# Patient Record
(demographics unavailable — no encounter records)

---

## 2016-12-13 NOTE — ED PROVIDER NOTE - CARE PLAN
Principal Discharge DX:	Sepsis Principal Discharge DX:	Sepsis  Secondary Diagnosis:	UTI (urinary tract infection)

## 2016-12-13 NOTE — ED PROVIDER NOTE - OBJECTIVE STATEMENT
59 yo female presents for evaluation of weakness 57 yo female presents for evaluation of weakness, acute onset of lower abdominal pain, and WBC's of 34 ( per nursing notes)

## 2016-12-13 NOTE — CONSULT NOTE ADULT - SUBJECTIVE AND OBJECTIVE BOX
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   Patient received chemo - Taxotere, Gemcitabin and Avastin.   She was admitted in september this year for gram - bacteremia, complicated by PE and urinary retention.   She received IV antibiotics, started on anticoagulation and had a saul placed.   She also began having vaginal bleeding and was evaluated by Dr Milligan, got 5 palliative radiation fractions for the bleeding   For the retention she was followed up by Dr. Paez and saul was removed.  She now comes with weakness, malaise and suprapubic discomfort after having a saul catheter placed 2 days ago due to another episode of urinary retention.   Patient got last chemo 1.5 weeks ago      PAST MEDICAL & SURGICAL HISTORY:  Uterine sarcoma  Cervical cancer  Uterine cancer  Dyslipidemia  Essential hypertension, hypertension with unspecified goal  Diabetes  S/P bariatric surgery: lap band  PE      sodium chloride 0.9% lock flush 3milliLiter(s) IV Push once  sodium chloride 0.9% Bolus 500milliLiter(s) IV Bolus every 15 minutes  ertapenem  IVPB 1000milliGRAM(s) IV Intermittent once  vancomycin    Solution 250milliGRAM(s) Oral every 6 hours  sodium chloride 0.9% with potassium chloride 20 mEq/L 1000milliLiter(s) IV Continuous <Continuous>  dronabinol 2.5milliGRAM(s) Oral two times a day  ondansetron Injectable 4milliGRAM(s) IV Push every 6 hours PRN  insulin lispro (HumaLOG) corrective regimen sliding scale  SubCutaneous three times a day before meals  dextrose 5%. 1000milliLiter(s) IV Continuous <Continuous>  dextrose Gel 1Dose(s) Oral once PRN  dextrose 50% Injectable 12.5Gram(s) IV Push once  dextrose 50% Injectable 25Gram(s) IV Push once  dextrose 50% Injectable 25Gram(s) IV Push once  glucagon  Injectable 1milliGRAM(s) IntraMuscular once PRN  metoprolol succinate ER 50milliGRAM(s) Oral daily    MEDICATIONS  (STANDING):  sodium chloride 0.9% lock flush 3milliLiter(s) IV Push once  sodium chloride 0.9% Bolus 500milliLiter(s) IV Bolus every 15 minutes  ertapenem  IVPB 1000milliGRAM(s) IV Intermittent once  vancomycin    Solution 250milliGRAM(s) Oral every 6 hours  sodium chloride 0.9% with potassium chloride 20 mEq/L 1000milliLiter(s) IV Continuous <Continuous>  dronabinol 2.5milliGRAM(s) Oral two times a day  insulin lispro (HumaLOG) corrective regimen sliding scale  SubCutaneous three times a day before meals  dextrose 5%. 1000milliLiter(s) IV Continuous <Continuous>  dextrose 50% Injectable 12.5Gram(s) IV Push once  dextrose 50% Injectable 25Gram(s) IV Push once  dextrose 50% Injectable 25Gram(s) IV Push once  metoprolol succinate ER 50milliGRAM(s) Oral daily    MEDICATIONS  (PRN):  ondansetron Injectable 4milliGRAM(s) IV Push every 6 hours PRN Nausea and/or Vomiting  dextrose Gel 1Dose(s) Oral once PRN Blood Glucose LESS THAN 70 milliGRAM(s)/deciLiter  glucagon  Injectable 1milliGRAM(s) IntraMuscular once PRN Glucose <70 milliGRAM(s)/deciLiter      Allergies    No Known Allergies    Intolerances        SOCIAL HISTORY:  No S/D/IVDU    FAMILY HISTORY:  Family history of coronary artery disease      LABS:                        7.6    39.48 )-----------( 98       ( 13 Dec 2016 19:38 )             21.3     13 Dec 2016 19:38    135    |  105    |  59.0   ----------------------------<  95     3.8     |  15.0   |  1.45     Ca    8.1        13 Dec 2016 19:38    TPro  5.5    /  Alb  2.1    /  TBili  0.3    /  DBili  x      /  AST  17     /  ALT  11     /  AlkPhos  444    13 Dec 2016 19:38    PT/INR - ( 13 Dec 2016 19:38 )   PT: 23.3 sec;   INR: 2.10 ratio                 ROS  - Headache  - Neck Stiffness  - Chest Pain  - SOB  + Abd pain mild +weakness +diarrhea  - Pelvic Pain  - Leg Pain  - Head Ache    Vital Signs Last 24 Hrs  T(C): 36.5, Max: 36.5 (12-13 @ 16:30)  T(F): 97.7, Max: 97.7 (12-13 @ 16:30)  HR: 64 (64 - 64)  BP: 112/56 (86/55 - 112/56)  BP(mean): --  RR: 18 (18 - 18)  SpO2: 97% (97% - 98%)    Pt in bed feeling better    HEENT: PEARLA  Neck: Supple  Cardio: S1 S2 No Murmur  Pulm: CTA No Rales or Ronchi  Abd: Soft NT mild distension BS+ No rebound  Rectal Pelvic Breast - refused  Ext: No DCT  Skin: No Rash  Neuro: Awake Pleasant    Leukocytosis secondary to infection - suspicious for C diff colitis, po vanco, invanz started by Gyn/ Onc pan culture  PE - Will start lovenox theraputic in am and stop Xeralto for now until abd pain etiology more clear  DM - SS  HTN- meds with parameters  Uterine Sarcoma - chemo 1 week ago HPI:  59 yo, history of uterine sarcoma, came in for anemia, weakness and suprapubic pain and multiple bowel movements over past 2 days, poor appetite 20lb wt loss    HPI:  Known to our service with 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   Patient received chemo - Taxotere, Gemcitabin and Avastin.   She was admitted in september this year for gram - bacteremia, complicated by PE and urinary retention.   She received IV antibiotics, started on anticoagulation and had a saul placed.   She also began having vaginal bleeding and was evaluated by Dr Milligan, got 5 palliative radiation fractions for the bleeding   For the retention she was followed up by Dr. Paez and saul was removed.  She now comes with weakness, malaise and suprapubic discomfort after having a saul catheter placed 2 days ago due to another episode of urinary retention.   Patient got last chemo 1.5 weeks ago      PAST MEDICAL & SURGICAL HISTORY:  Uterine sarcoma  Cervical cancer  Uterine cancer  Dyslipidemia  Essential hypertension, hypertension with unspecified goal  Diabetes  S/P bariatric surgery: lap band  PE    Home meds  Norvasc 5  bentyl  zofrna 4mg q6h  Questran 4g  Xeralto 20 qd  Kcl  Toprol 50 qd  Insulin  Toujeo 8units sq qd    sodium chloride 0.9% lock flush 3milliLiter(s) IV Push once  sodium chloride 0.9% Bolus 500milliLiter(s) IV Bolus every 15 minutes  ertapenem  IVPB 1000milliGRAM(s) IV Intermittent once  vancomycin    Solution 250milliGRAM(s) Oral every 6 hours  sodium chloride 0.9% with potassium chloride 20 mEq/L 1000milliLiter(s) IV Continuous <Continuous>  dronabinol 2.5milliGRAM(s) Oral two times a day  ondansetron Injectable 4milliGRAM(s) IV Push every 6 hours PRN  insulin lispro (HumaLOG) corrective regimen sliding scale  SubCutaneous three times a day before meals  dextrose 5%. 1000milliLiter(s) IV Continuous <Continuous>  dextrose Gel 1Dose(s) Oral once PRN  dextrose 50% Injectable 12.5Gram(s) IV Push once  dextrose 50% Injectable 25Gram(s) IV Push once  dextrose 50% Injectable 25Gram(s) IV Push once  glucagon  Injectable 1milliGRAM(s) IntraMuscular once PRN  metoprolol succinate ER 50milliGRAM(s) Oral daily    MEDICATIONS  (STANDING):  sodium chloride 0.9% lock flush 3milliLiter(s) IV Push once  sodium chloride 0.9% Bolus 500milliLiter(s) IV Bolus every 15 minutes  ertapenem  IVPB 1000milliGRAM(s) IV Intermittent once  vancomycin    Solution 250milliGRAM(s) Oral every 6 hours  sodium chloride 0.9% with potassium chloride 20 mEq/L 1000milliLiter(s) IV Continuous <Continuous>  dronabinol 2.5milliGRAM(s) Oral two times a day  insulin lispro (HumaLOG) corrective regimen sliding scale  SubCutaneous three times a day before meals  dextrose 5%. 1000milliLiter(s) IV Continuous <Continuous>  dextrose 50% Injectable 12.5Gram(s) IV Push once  dextrose 50% Injectable 25Gram(s) IV Push once  dextrose 50% Injectable 25Gram(s) IV Push once  metoprolol succinate ER 50milliGRAM(s) Oral daily    MEDICATIONS  (PRN):  ondansetron Injectable 4milliGRAM(s) IV Push every 6 hours PRN Nausea and/or Vomiting  dextrose Gel 1Dose(s) Oral once PRN Blood Glucose LESS THAN 70 milliGRAM(s)/deciLiter  glucagon  Injectable 1milliGRAM(s) IntraMuscular once PRN Glucose <70 milliGRAM(s)/deciLiter      Allergies    No Known Allergies    Intolerances        SOCIAL HISTORY:  No S/D/IVDU    FAMILY HISTORY:  Family history of coronary artery disease      LABS:                        7.6    39.48 )-----------( 98       ( 13 Dec 2016 19:38 )             21.3     13 Dec 2016 19:38    135    |  105    |  59.0   ----------------------------<  95     3.8     |  15.0   |  1.45     Ca    8.1        13 Dec 2016 19:38    TPro  5.5    /  Alb  2.1    /  TBili  0.3    /  DBili  x      /  AST  17     /  ALT  11     /  AlkPhos  444    13 Dec 2016 19:38    PT/INR - ( 13 Dec 2016 19:38 )   PT: 23.3 sec;   INR: 2.10 ratio                 ROS  - Headache  - Neck Stiffness  - Chest Pain  - SOB  + Abd pain mild +weakness +diarrhea  - Pelvic Pain  - Leg Pain  - Head Ache    Vital Signs Last 24 Hrs  T(C): 36.5, Max: 36.5 (12-13 @ 16:30)  T(F): 97.7, Max: 97.7 (12-13 @ 16:30)  HR: 64 (64 - 64)  BP: 112/56 (86/55 - 112/56)  BP(mean): --  RR: 18 (18 - 18)  SpO2: 97% (97% - 98%)    Pt in bed feeling better    HEENT: PEARLA  Neck: Supple  Cardio: S1 S2 No Murmur  Pulm: CTA No Rales or Ronchi  Abd: Soft NT mild distension BS+ No rebound  Rectal Pelvic Breast - refused  Ext: No DCT  Skin: No Rash  Neuro: Awake Pleasant    Leukocytosis secondary to infection - suspicious for C diff colitis, po vanco, invanz started by Gyn/ Onc pan culture  PE - Will start lovenox theraputic in am and stop Xeralto for now until abd pain etiology more clear  DM - SS  HTN- meds with parameters  Uterine Sarcoma - chemo 1 week ago HPI:  59 yo, history of uterine sarcoma, came in for anemia, weakness and suprapubic pain and multiple bowel movements over past 2 days, poor appetite 20lb wt loss    HPI:  Known to our service with 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   Patient received chemo - Taxotere, Gemcitabin and Avastin.   She was admitted in september this year for gram - bacteremia, complicated by PE and urinary retention.   She received IV antibiotics, started on anticoagulation and had a saul placed.   She also began having vaginal bleeding and was evaluated by Dr Milligan, got 5 palliative radiation fractions for the bleeding   For the retention she was followed up by Dr. Paez and saul was removed.  She now comes with weakness, malaise and suprapubic discomfort after having a saul catheter placed 2 days ago due to another episode of urinary retention.   Patient got last chemo 1.5 weeks ago      PAST MEDICAL & SURGICAL HISTORY:  Uterine sarcoma  Cervical cancer  Uterine cancer  Dyslipidemia  Essential hypertension, hypertension with unspecified goal  Diabetes  S/P bariatric surgery: lap band  PE    Home meds  Norvasc 5  bentyl  zofrna 4mg q6h  Questran 4g  Xeralto 20 qd  Kcl  Toprol 50 qd  Insulin ss  Toujeo 8units sq qd  Vancomycin 250 qid  Invanz 1g q24      sodium chloride 0.9% lock flush 3milliLiter(s) IV Push once  sodium chloride 0.9% Bolus 500milliLiter(s) IV Bolus every 15 minutes  ertapenem  IVPB 1000milliGRAM(s) IV Intermittent once  vancomycin    Solution 250milliGRAM(s) Oral every 6 hours  sodium chloride 0.9% with potassium chloride 20 mEq/L 1000milliLiter(s) IV Continuous <Continuous>  dronabinol 2.5milliGRAM(s) Oral two times a day  ondansetron Injectable 4milliGRAM(s) IV Push every 6 hours PRN  insulin lispro (HumaLOG) corrective regimen sliding scale  SubCutaneous three times a day before meals  dextrose 5%. 1000milliLiter(s) IV Continuous <Continuous>  dextrose Gel 1Dose(s) Oral once PRN  dextrose 50% Injectable 12.5Gram(s) IV Push once  dextrose 50% Injectable 25Gram(s) IV Push once  dextrose 50% Injectable 25Gram(s) IV Push once  glucagon  Injectable 1milliGRAM(s) IntraMuscular once PRN  metoprolol succinate ER 50milliGRAM(s) Oral daily    MEDICATIONS  (STANDING):  sodium chloride 0.9% lock flush 3milliLiter(s) IV Push once  sodium chloride 0.9% Bolus 500milliLiter(s) IV Bolus every 15 minutes  ertapenem  IVPB 1000milliGRAM(s) IV Intermittent once  vancomycin    Solution 250milliGRAM(s) Oral every 6 hours  sodium chloride 0.9% with potassium chloride 20 mEq/L 1000milliLiter(s) IV Continuous <Continuous>  dronabinol 2.5milliGRAM(s) Oral two times a day  insulin lispro (HumaLOG) corrective regimen sliding scale  SubCutaneous three times a day before meals  dextrose 5%. 1000milliLiter(s) IV Continuous <Continuous>  dextrose 50% Injectable 12.5Gram(s) IV Push once  dextrose 50% Injectable 25Gram(s) IV Push once  dextrose 50% Injectable 25Gram(s) IV Push once  metoprolol succinate ER 50milliGRAM(s) Oral daily    MEDICATIONS  (PRN):  ondansetron Injectable 4milliGRAM(s) IV Push every 6 hours PRN Nausea and/or Vomiting  dextrose Gel 1Dose(s) Oral once PRN Blood Glucose LESS THAN 70 milliGRAM(s)/deciLiter  glucagon  Injectable 1milliGRAM(s) IntraMuscular once PRN Glucose <70 milliGRAM(s)/deciLiter      Allergies    No Known Allergies    Intolerances        SOCIAL HISTORY:  No S/D/IVDU    FAMILY HISTORY:  Family history of coronary artery disease      LABS:                        7.6    39.48 )-----------( 98       ( 13 Dec 2016 19:38 )             21.3     13 Dec 2016 19:38    135    |  105    |  59.0   ----------------------------<  95     3.8     |  15.0   |  1.45     Ca    8.1        13 Dec 2016 19:38    TPro  5.5    /  Alb  2.1    /  TBili  0.3    /  DBili  x      /  AST  17     /  ALT  11     /  AlkPhos  444    13 Dec 2016 19:38    PT/INR - ( 13 Dec 2016 19:38 )   PT: 23.3 sec;   INR: 2.10 ratio                 ROS  - Headache  - Neck Stiffness  - Chest Pain  - SOB  + Abd pain mild +weakness +diarrhea  - Pelvic Pain  - Leg Pain  - Head Ache    Vital Signs Last 24 Hrs  T(C): 36.5, Max: 36.5 (12-13 @ 16:30)  T(F): 97.7, Max: 97.7 (12-13 @ 16:30)  HR: 64 (64 - 64)  BP: 112/56 (86/55 - 112/56)  BP(mean): --  RR: 18 (18 - 18)  SpO2: 97% (97% - 98%)    Pt in bed feeling better    HEENT: PEARLA  Neck: Supple  Cardio: S1 S2 No Murmur  Pulm: CTA No Rales or Ronchi  Abd: Soft NT mild distension BS+ No rebound  Rectal Pelvic Breast - refused  Ext: No DCT  Skin: No Rash  Neuro: Awake Pleasant    Leukocytosis secondary to infection - suspicious for C diff colitis, po vanco, invanz started by Gyn/ Onc pan culture  PE - Will start lovenox theraputic in am and stop Xeralto for now until abd pain etiology more clear  DM - SS  HTN- meds with parameters  Uterine Sarcoma - chemo 1 week ago

## 2016-12-13 NOTE — ED PROVIDER NOTE - ENMT, MLM
Airway patent, Nasal mucosa clear. Mouth with normal mucosa. Throat has no vesicles, no oropharyngeal exudates and uvula is midline.

## 2016-12-13 NOTE — ED PROVIDER NOTE - ENMT NEGATIVE STATEMENT, MLM
Ears: no ear pain and no hearing problems.Nose: no nasal congestion and no nasal drainage.Mouth/Throat: no dysphagia, no hoarseness and no throat pain.Neck: no lumps, no pain, no stiffness and no swollen glands.

## 2016-12-13 NOTE — CONSULT NOTE ADULT - SUBJECTIVE AND OBJECTIVE BOX
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   Patient received chemo - Taxotere, Gemcitabin and Avastin.   She was admitted in september this year for gram - bacteremia, complicated by PE and urinary retention.   She received IV antibiotics, started on anticoagulation and had a saul placed.   She also began having vaginal bleeding and was evaluated by Dr Milligan, got 5 palliative radiation fractions for the bleeding   For the retention she was followed up by Dr. Paez and saul was removed.  She now comes with weakness, malaise and suprapubic discomfort after having a saul catheter placed 2 days ago due to another episode of urinary retention.   Patient got last chemo 1.5 weeks ago        OB/GYN HISTORY:     Surgical History:      S/P bariatric surgery      Past Medical History:   Uterine sarcoma  PE  Dyslipidemia  Essential hypertension, hypertension with unspecified goal  Diabetes      No Known Allergies      sodium chloride 0.9% lock flush 3milliLiter(s) IV Push once  sodium chloride 0.9% Bolus 500milliLiter(s) IV Bolus every 15 minutes      FAMILY HISTORY:  Family history of coronary artery disease (Father)        MEDICATIONS  (STANDING):  sodium chloride 0.9% lock flush 3milliLiter(s) IV Push once  sodium chloride 0.9% Bolus 500milliLiter(s) IV Bolus every 15 minutes        OBJECTIVE FINDINGS:    Vital Signs Last 24 Hrs  T(C): 36.5, Max: 36.5 (12-13 @ 16:30)  T(F): 97.7, Max: 97.7 (12-13 @ 16:30)  HR: 64 (64 - 64)  BP: 112/56 (86/55 - 112/56)  BP(mean): --  RR: 18 (18 - 18)  SpO2: 97% (97% - 98%)    PHYSICAL EXAM:    GENERAL: NAD, well-developed  HEAD:  Atraumatic, Normocephalic  EYES: EOMI, PERRLA, conjunctiva and sclera clear  NERVOUS SYSTEM:  Alert & Oriented X3, Good concentration  CHEST/LUNG: Clear to percussion bilaterally; No rales, rhonchi, wheezing, or rubs  HEART: Regular rate and rhythm; No murmurs, rubs, or gallops  ABDOMEN: Soft, Nontender, Nondistended; Bowel sounds present, No rebound, No guarding  EXTREMITIES:  2+ Peripheral Pulses, No clubbing, cyanosis, or edema, Samir's sign negative        LABS:  BCx, UCx, CBC, CMP, INR, stool cx

## 2016-12-13 NOTE — ED ADULT TRIAGE NOTE - NS ED TRIAGE AVPU SCALE
Alert-The patient is alert, awake and responds to voice. The patient is oriented to time, place, and person. The triage nurse is able to obtain subjective information.

## 2016-12-13 NOTE — ED ADULT TRIAGE NOTE - CHIEF COMPLAINT QUOTE
Pt BIBA from [facility] for elevated WBC count, currently being treated with ABX for c-diff. Pt also comes with saul cath in place. Denies any other complaints

## 2016-12-13 NOTE — ED PROVIDER NOTE - PMH
Diabetes    Dyslipidemia    Essential hypertension, hypertension with unspecified goal    Uterine sarcoma

## 2016-12-13 NOTE — ED PROVIDER NOTE - PROGRESS NOTE DETAILS
GYN PA contacted and case discussed as patient states she is a Dr. Lewis patient. Dr. Ribeiro has evaluated. Dr. Russell has evaluated the pateint and will admit to her service for further management.

## 2016-12-14 NOTE — CONSULT NOTE ADULT - SUBJECTIVE AND OBJECTIVE BOX
[facility] SAI COLLINS MD FACP   JARED NORTON MD  Diplomates American Board of Internal Medicine and Infecctious Diseases      MRN-[id number]  TRAN MCDONALD is a 58y  Female     CC:ADMITTED WITH SEVERE LEUKOCYTOSIS TO 40K    HX STAGE 4 UTERINE CANCER ON CHEMOTHERAPY  1 WEEK AGO GIVEN PO ANTIBIOTICS AT NH FOR UTI  LOOSE WATERY STOOLS PAST 48HRS WITH PAIN    Past Medical & Surgical Hx:  PAST MEDICAL & SURGICAL HISTORY:  Uterine sarcoma  Cervical cancer  Uterine cancer  Dyslipidemia  Essential hypertension, hypertension with unspecified goal  Diabetes  S/P bariatric surgery: lap band      Problem List:  HEALTH ISSUES - PROBLEM Dx:  Clostridium difficile colitis: Clostridium difficile colitis  UTI (urinary tract infection): UTI (urinary tract infection)  Urinary retention: Urinary retention  Pulmonary embolism, other: Pulmonary embolism, other  Uterine sarcoma: Uterine sarcoma      ANTIBIOTICS:   vancomycin    Solution 250milliGRAM(s) Oral every 6 hours  metroNIDAZOLE  IVPB  IV Intermittent        Review of Systems: - Negative except as mentioned below.PAIN LLQ AND DIARRHEA      Physical Exam:    Vital Signs Last 24 Hrs  T(C): 36.3, Max: 36.6 (12-14 @ 09:30)  T(F): 97.4, Max: 97.8 (12-14 @ 09:30)  HR: 81 (60 - 81)  BP: 131/71 (102/59 - 131/71)  BP(mean): --  RR: 18 (18 - 18)  SpO2: 100% (99% - 100%)        GEN: NAD, pleasant. ILL APPEARING  HEENT: normocephalic and atraumatic. EOMI. IVÁN. Moist mucosa. Clear Posterior pharynx.  NECK: Supple. No carotid bruits.  No lymphadenopathy or thyromegaly.  LUNGS: Clear to auscultation.  HEART: Regular rate and rhythm without murmur.  ABDOMEN: Soft, nontender, and nondistended.  Positive bowel sounds.  SL DISTENDED. ACTIVE BSS. TENDER NO REBOUND LLQ  EXTREMITIES: Without any cyanosis, clubbing, rash, lesions or edema.  NEUROLOGIC: Cranial nerves II through XII are grossly intact.  MUSCULOSKELETAL:  SKIN: No ulceration or induration present.      Labs:                        7.6    39.48 )-----------( 98       ( 13 Dec 2016 19:38 )             21.3     13 Dec 2016 19:38    135    |  105    |  59.0   ----------------------------<  95     3.8     |  15.0   |  1.45     Ca    8.1        13 Dec 2016 19:38    TPro  5.5    /  Alb  2.1    /  TBili  0.3    /  DBili  x      /  AST  17     /  ALT  11     /  AlkPhos  444    13 Dec 2016 19:38    PT/INR - ( 13 Dec 2016 19:38 )   PT: 23.3 sec;   INR: 2.10 ratio           Urinalysis Basic - ( 14 Dec 2016 03:46 )    Color: Pale Yellow / Appearance: very cloudy / S.010 / pH: x  Gluc: x / Ketone: Negative  / Bili: Negative / Urobili: Negative mg/dL   Blood: x / Protein: 100 mg/dL / Nitrite: Negative   Leuk Esterase: Moderate / RBC: 6-10 /HPF / WBC TNTC /HPF   Sq Epi: x / Non Sq Epi: Occasional / Bacteria: Occasional        Hemoglobin: 7.6 g/dL ( @ 19:38)  WBC Count: 39.48 K/uL ( @ 19:38)  Hematocrit: 21.3 % ( @ 19:38)  Platelet Count - Automated: 98 K/uL ( @ 19:38)    Blood Urea Nitrogen, Serum: 59.0 mg/dL <H> ( @ 19:38)  Sodium, Serum: 135 mmol/L ( @ 19:38)  Potassium, Serum: 3.8 mmol/L ( 19:38)          MICROBIOLOGY  Clostridium difficile Toxin by PCR (16 @ 21:28)    C Diff by PCR Result: Detected    Clostridium difficile Toxin by PCR: RESULT INTERPRETATION:    Detected - Clostridium difficile toxin B detected by amplified DNA PCR .        RADIOLOGY  No significant retroperitoneal lymphadenopathy. Mildly enlarged left   para-aortic lymph nodes, unchanged. No definite pelvic adenopathy. A   Manning catheter is noted in the urinary bladder. There is diffuse   thickening of the bladder wall. There is enlargement of the uterus. There   is now diffuse thickening of the rectal wall which is a new finding since   the prior examination. In addition there is diffuse thickening of the   wall of the sigmoid, descending colon, splenic flexure. These findings   are new.

## 2016-12-14 NOTE — PROGRESS NOTE ADULT - SUBJECTIVE AND OBJECTIVE BOX
GYNECOLOGIC ONCOLOGY PROGRESS NOTE    PROBLEMS:    Urinary retention  Pulmonary embolism, other  Uterine sarcoma  Elevated WBC    Pt seen and examined at bedside. Still feeling weak, received 1 XPRBC and planned for another unit, will get CBC 4 hours post transfusion.  Labs significant for WBC 39.48k, patient denies getting any WBC proliferating agents. Planned ID consult.   Patient was on Xeralto, last dose yesterday needs lovenox as she had a PE earlier this year.  Patient has diarrhea, C.Diff pending.   Indwelling urinary catheter for urinary retention.   FeNa 2%, creatinine 1.45.  Urine in saul looks cloudy  Getting IV Invanz and PO vancomycin  Blood and urine cultures pending.       Physical Exam:  Constitutional: NAD  Pulmonary: clear to auscultation bilaterally   Cardiovascular: Regular rate and rhythm   Abdomen: soft,non-distended, normal bowel sounds, minimal suprapubic tenderness  Extremities: no lower extremity edema or calve tenderness, Samir's sign negative.  :     VITALS:  T(F): 97.6, Max: 97.7 (12-13 @ 16:30)  HR: 70 (60 - 71)  BP: 128/60 (86/55 - 128/60)  RR: 18 (18 - 18)  SpO2: 100% (97% - 100%)      MEDICATIONS  (STANDING):  vancomycin    Solution 250milliGRAM(s) Oral every 6 hours  sodium chloride 0.9% with potassium chloride 20 mEq/L 1000milliLiter(s) IV Continuous <Continuous>  dronabinol 2.5milliGRAM(s) Oral two times a day  insulin lispro (HumaLOG) corrective regimen sliding scale  SubCutaneous three times a day before meals  dextrose 5%. 1000milliLiter(s) IV Continuous <Continuous>  dextrose 50% Injectable 12.5Gram(s) IV Push once  dextrose 50% Injectable 25Gram(s) IV Push once  dextrose 50% Injectable 25Gram(s) IV Push once  metoprolol succinate ER 50milliGRAM(s) Oral daily  ertapenem  IVPB 1000milliGRAM(s) IV Intermittent every 24 hours          LABS:                        7.6    39.48 )-----------( 98       ( 13 Dec 2016 19:38 )             21.3     13 Dec 2016 19:38    135    |  105    |  59.0   ----------------------------<  95     3.8     |  15.0   |  1.45     Ca    8.1        13 Dec 2016 19:38    TPro  5.5    /  Alb  2.1    /  TBili  0.3    /  DBili  x      /  AST  17     /  ALT  11     /  AlkPhos  444    13 Dec 2016 19:38    PT/INR - ( 13 Dec 2016 19:38 )   PT: 23.3 sec;   INR: 2.10 ratio           Urinalysis Basic - ( 14 Dec 2016 03:46 )    Color: Pale Yellow / Appearance: very cloudy / S.010 / pH: x  Gluc: x / Ketone: Negative  / Bili: Negative / Urobili: Negative mg/dL   Blood: x / Protein: 100 mg/dL / Nitrite: Negative   Leuk Esterase: Moderate / RBC: 6-10 /HPF / WBC TNTC /HPF   Sq Epi: x / Non Sq Epi: Occasional / Bacteria: Occasional        RADIOLOGY & ADDITIONAL TESTS:

## 2016-12-14 NOTE — PROGRESS NOTE ADULT - SUBJECTIVE AND OBJECTIVE BOX
HPI:  59 yo, history of uterine sarcoma, came in for anemia, weakness and suprapubic pain and multiple bowel movements over past 2 days, poor appetite 20lb wt loss     Allergies    No Known Allergies    Intolerances      Uterine sarcoma  Cervical cancer  Uterine cancer  Dyslipidemia  Essential hypertension, hypertension with unspecified goal  Diabetes    MEDICATIONS  (STANDING):  vancomycin    Solution 250milliGRAM(s) Oral every 6 hours  sodium chloride 0.9% with potassium chloride 20 mEq/L 1000milliLiter(s) IV Continuous <Continuous>  dronabinol 2.5milliGRAM(s) Oral two times a day  insulin lispro (HumaLOG) corrective regimen sliding scale  SubCutaneous three times a day before meals  dextrose 5%. 1000milliLiter(s) IV Continuous <Continuous>  dextrose 50% Injectable 12.5Gram(s) IV Push once  dextrose 50% Injectable 25Gram(s) IV Push once  dextrose 50% Injectable 25Gram(s) IV Push once  metoprolol succinate ER 50milliGRAM(s) Oral daily  enoxaparin Injectable 70milliGRAM(s) SubCutaneous every 12 hours  acetaminophen    Suspension. 500milliGRAM(s) Oral once  metroNIDAZOLE  IVPB  IV Intermittent   dextrose 5%. 1000milliLiter(s) IV Continuous <Continuous>  metroNIDAZOLE  IVPB 500milliGRAM(s) IV Intermittent once    MEDICATIONS  (PRN):  ondansetron Injectable 4milliGRAM(s) IV Push every 6 hours PRN Nausea and/or Vomiting  dextrose Gel 1Dose(s) Oral once PRN Blood Glucose LESS THAN 70 milliGRAM(s)/deciLiter  glucagon  Injectable 1milliGRAM(s) IntraMuscular once PRN Glucose <70 milliGRAM(s)/deciLiter                           11.9   45.67 )-----------( 102      ( 14 Dec 2016 17:59 )             33.4     13 Dec 2016 19:38    135    |  105    |  59.0   ----------------------------<  95     3.8     |  15.0   |  1.45     Ca    8.1        13 Dec 2016 19:38  Mg     1.5       14 Dec 2016 18:08    TPro  5.5    /  Alb  2.1    /  TBili  0.3    /  DBili  x      /  AST  17     /  ALT  11     /  AlkPhos  444    13 Dec 2016 19:38    PT/INR - ( 13 Dec 2016 19:38 )   PT: 23.3 sec;   INR: 2.10 ratio           Urinalysis Basic - ( 14 Dec 2016 03:46 )    Color: Pale Yellow / Appearance: very cloudy / S.010 / pH: x  Gluc: x / Ketone: Negative  / Bili: Negative / Urobili: Negative mg/dL   Blood: x / Protein: 100 mg/dL / Nitrite: Negative   Leuk Esterase: Moderate / RBC: 6-10 /HPF / WBC TNTC /HPF   Sq Epi: x / Non Sq Epi: Occasional / Bacteria: Occasional    ;  Vital Signs Last 24 Hrs  T(C): 36.2, Max: 36.6 ( @ 09:30)  T(F): 97.1, Max: 97.8 ( @ 09:30)  HR: 77 (64 - 81)  BP: 128/68 (111/56 - 131/71)  BP(mean): --  RR: 18 (18 - 18)  SpO2: 100% (99% - 100%)    CAPILLARY BLOOD GLUCOSE  123 (14 Dec 2016 20:39)  66 (14 Dec 2016 18:15)  64 (14 Dec 2016 18:00)  82 (14 Dec 2016 12:05)  73 (14 Dec 2016 08:30)  72 (14 Dec 2016 00:45)      I & Os for current day (as of  @ 22:14)  =============================================  IN: 1250 ml / OUT: 0 ml / NET: 1250 ml    Pt in bed feeling better Diarrhea less frequent No SOB, CP, ABd pain    HEENT: PEARLA  Neck: Supple  Cardio: S1 S2 No Murmur  Pulm: CTA No Rales or Ronchi  Abd: Soft NT mild distension BS+ No rebound  Rectal Pelvic Breast - refused  Ext: No DCT  Skin: No Rash  Neuro: Awake Pleasant    C diff colitis- po vanco, IV flagyl  PE - lovenox theraputic   DM - SS  HTN- meds with parameters  Uterine Sarcoma - chemo 1 week ago

## 2016-12-14 NOTE — PROGRESS NOTE ADULT - SUBJECTIVE AND OBJECTIVE BOX
Patient seen and evaluated  Feeling unchanged  C.Diff positive, patient getting PO Vanco  IV INVANZ stopped  CT done, shows increase in size of liver and lung mets, no abscess, but evidence of colitis.  Patient is on regular diet.   mild dehydration    Abdomen soft, not tender  Extremities - no calf tenderness

## 2016-12-15 NOTE — PROGRESS NOTE ADULT - SUBJECTIVE AND OBJECTIVE BOX
GYNECOLOGIC ONCOLOGY PROGRESS NOTE      PROBLEMS:    Clostridium difficile colitis  UTI (urinary tract infection)  Urinary retention  Pulmonary embolism, other  Uterine sarcoma      Pt seen and examined at bedside. Reports feeling better, diarrhea is improving.   Patient with known C.Diff, getting PO Vancomycin and IV Flagyl as per ID   Pain well-controlled.   Denies Nausea, or vomiting.  Denies shortness of breath, chest pain or dyspnea on exertion.  Tolerating diet.    OBJECTIVE:     VITALS:  T(F): 96.1, Max: 97.8 (12-14 @ 09:30)  HR: 68 (65 - 81)  BP: 128/62 (122/52 - 131/71)  RR: 18 (18 - 18)  SpO2: 97% (97% - 97%)        MEDICATIONS  (STANDING):  vancomycin    Solution 250milliGRAM(s) Oral every 6 hours  dronabinol 2.5milliGRAM(s) Oral two times a day  insulin lispro (HumaLOG) corrective regimen sliding scale  SubCutaneous three times a day before meals  dextrose 5%. 1000milliLiter(s) IV Continuous <Continuous>  dextrose 50% Injectable 12.5Gram(s) IV Push once  dextrose 50% Injectable 25Gram(s) IV Push once  dextrose 50% Injectable 25Gram(s) IV Push once  metoprolol succinate ER 50milliGRAM(s) Oral daily  enoxaparin Injectable 70milliGRAM(s) SubCutaneous every 12 hours  acetaminophen    Suspension. 500milliGRAM(s) Oral once  metroNIDAZOLE  IVPB  IV Intermittent   dextrose 5%. 1000milliLiter(s) IV Continuous <Continuous>  metroNIDAZOLE  IVPB 500milliGRAM(s) IV Intermittent every 8 hours  dextrose 5% with potassium chloride 20 mEq/L 1000milliLiter(s) IV Continuous <Continuous>    MEDICATIONS  (PRN):  ondansetron Injectable 4milliGRAM(s) IV Push every 6 hours PRN Nausea and/or Vomiting  dextrose Gel 1Dose(s) Oral once PRN Blood Glucose LESS THAN 70 milliGRAM(s)/deciLiter  glucagon  Injectable 1milliGRAM(s) IntraMuscular once PRN Glucose <70 milliGRAM(s)/deciLiter      Physical Exam:  Constitutional: NAD  Pulmonary: clear to auscultation bilaterally   Cardiovascular: Regular rate and rhythm   Abdomen: soft, non-tender, non-distended, normal bowel signs  Extremities: no lower extremity edema or calve tenderness, Samir's sign negative.      LABS:                        11.9   45.67 )-----------( 102      ( 14 Dec 2016 17:59 )             33.4     13 Dec 2016 19:38    135    |  105    |  59.0   ----------------------------<  95     3.8     |  15.0   |  1.45     Ca    8.1        13 Dec 2016 19:38  Mg     1.5       14 Dec 2016 18:08    TPro  5.5    /  Alb  2.1    /  TBili  0.3    /  DBili  x      /  AST  17     /  ALT  11     /  AlkPhos  444    13 Dec 2016 19:38    PT/INR - ( 13 Dec 2016 19:38 )   PT: 23.3 sec;   INR: 2.10 ratio           Urinalysis Basic - ( 14 Dec 2016 03:46 )    Color: Pale Yellow / Appearance: very cloudy / S.010 / pH: x  Gluc: x / Ketone: Negative  / Bili: Negative / Urobili: Negative mg/dL   Blood: x / Protein: 100 mg/dL / Nitrite: Negative   Leuk Esterase: Moderate / RBC: 6-10 /HPF / WBC TNTC /HPF   Sq Epi: x / Non Sq Epi: Occasional / Bacteria: Occasional

## 2016-12-15 NOTE — PROGRESS NOTE ADULT - SUBJECTIVE AND OBJECTIVE BOX
[facility] [place] SAI COLLINS MD FACP   JARED NORTON MD  Diplomates American Board of Internal Medicine and Infectious Diseases      TRAN MCDONALD  [id number]  58y    INTERVAL HPI/OVERNIGHT EVENTS:  LESS DIARRHEA  GNR IN BLOOD - HAD PSEUDOMONAS ON PAST  ABNL U/A WITH LOW COL CNT GNR    Vital Signs Last 24 Hrs  T(C): 36.3, Max: 36.4 (12-14 @ 10:05)  T(F): 97.4, Max: 97.6 (12-14 @ 10:05)  HR: 70 (67 - 81)  BP: 125/69 (122/58 - 131/71)  BP(mean): --  RR: 18 (18 - 18)  SpO2: 97% (97% - 97%)    ANTIBIOTICS  vancomycin    Solution 250milliGRAM(s) Oral every 6 hours  metroNIDAZOLE  IVPB  IV Intermittent   metroNIDAZOLE  IVPB 500milliGRAM(s) IV Intermittent every 8 hours  piperacillin/tazobactam IVPB. 3.375Gram(s) IV Intermittent once  piperacillin/tazobactam IVPB. 3.375Gram(s) IV Intermittent every 12 hours      Allergies    No Known Allergies    Intolerances        REVIEW OF SYSTEMS:    PHYSICAL EXAM:  Vital Signs Last 24 Hrs  T(C): 36.3, Max: 36.4 (12-14 @ 10:05)  T(F): 97.4, Max: 97.6 (12-14 @ 10:05)  HR: 70 (67 - 81)  BP: 125/69 (122/58 - 131/71)  BP(mean): --  RR: 18 (18 - 18)  SpO2: 97% (97% - 97%)      GEN: NAD, pleasant  HEENT: normocephalic and atraumatic. EOMI. IVÁN. Moist mucosa. Clear Posterior pharynx.  NECK: Supple. No carotid bruits.  No lymphadenopathy or thyromegaly.  LUNGS: Clear to auscultation.  HEART: Regular rate and rhythm without murmur.  ABDOMEN: Soft, nontender, and nondistended.  Positive bowel sounds.  LESS PAIN, NO GUARDING, LESS DIARRHEA  EXTREMITIES: Without any cyanosis, clubbing, rash, lesions or edema.  NEUROLOGIC: Cranial nerves II through XII are grossly intact.  MUSCULOSKELETAL:  SKIN: No ulceration or induration present    LABS:                        10.8   45.64 )-----------( 129      ( 15 Dec 2016 07:15 )             30.6       15 Dec 2016 07:15    133    |  106    |  55.0   ----------------------------<  125    3.9     |  13.0   |  2.32     Ca    7.8        15 Dec 2016 07:15  Mg     1.5       14 Dec 2016 18:08    TPro  5.3    /  Alb  1.7    /  TBili  0.3    /  DBili  x      /  AST  12     /  ALT  9      /  AlkPhos  436    15 Dec 2016 07:15        12-15 @ 07:15  hct 30.6 % hgb 10.8 g/dL    12-15 @ 07:15  plat 129 K/uL wbc 45.64 K/uL    12-14 @ 17:59  hct 33.4 % hgb 11.9 g/dL    12-14 @ 17:59  plat 102 K/uL wbc 45.67 K/uL    12-13 @ 19:38  hct 21.3 % hgb 7.6 g/dL    12-13 @ 19:38  plat 98 K/uL wbc 39.48 K/uL      12-15-16  creat 2.32 mg/dL gfr 26 mL/min bun 55.0 mg/dL k 3.9 mmol/L  12-13-16  creat 1.45 mg/dL gfr 46 mL/min bun 59.0 mg/dL k 3.8 mmol/L      MICROBIOLOGY:  Culture Results:   50,000 CFU/ml Gram Negative Rods Identification and susceptibility to  follow. (12-14 @ 03:46)        RADIOLOGY & ADDITIONAL STUDIES:      ASSESSMENT AND PLAN:  CDIFF COLITIS ON FLAGYL IV AND PO VANCO - APPEARS BETTER  GNR IN BLOOD - NEW MUST BE FROM  TRACT. ADD ZOSYN WITH H/O PSEUDO IN PAST  CHK IN AM

## 2016-12-16 NOTE — PROGRESS NOTE ADULT - SUBJECTIVE AND OBJECTIVE BOX
[facility] [place] SAI COLLINS MD FACP   JARED NORTON MD  Diplomates American Board of Internal Medicine and Infectious Diseases      TRAN MCDONALD  MRN-[id number]  58y    INTERVAL HPI/OVERNIGHT EVENTS:  afebrile  given incorreect info re pos blood c.s gnr - all blood c.s neg and iv abs stopped  pt persists with diarrhea but maybe sl less  min abd pain  4-8x/day    Vital Signs Last 24 Hrs  T(C): 36.6, Max: 36.6 (12-16 @ 04:38)  T(F): 97.8, Max: 97.8 (12-16 @ 04:38)  HR: 71 (67 - 71)  BP: 116/71 (115/65 - 116/71)  BP(mean): --  RR: 16 (16 - 17)  SpO2: 94% (94% - 94%)    ANTIBIOTICS  vancomycin    Solution 250milliGRAM(s) Oral every 6 hours  metroNIDAZOLE  IVPB  IV Intermittent   metroNIDAZOLE  IVPB 500milliGRAM(s) IV Intermittent every 8 hours      Allergies    No Known Allergies    Intolerances        REVIEW OF SYSTEMS:DIARRHEA    PHYSICAL EXAM:  Vital Signs Last 24 Hrs  T(C): 36.6, Max: 36.6 (12-16 @ 04:38)  T(F): 97.8, Max: 97.8 (12-16 @ 04:38)  HR: 71 (67 - 71)  BP: 116/71 (115/65 - 116/71)  BP(mean): --  RR: 16 (16 - 17)  SpO2: 94% (94% - 94%)      GEN: NAD, pleasant  HEENT: normocephalic and atraumatic. EOMI. IVÁN. Moist mucosa. Clear Posterior pharynx.  NECK: Supple. No carotid bruits.  No lymphadenopathy or thyromegaly.  LUNGS: Clear to auscultation.  HEART: Regular rate and rhythm without murmur.  ABDOMEN: Soft, nontender, and nondistended.  Positive bowel sounds.  No hepatosplenomegaly was noted.NO GUARDING  EXTREMITIES: Without any cyanosis, clubbing, rash, lesions or edema.  NEUROLOGIC: Cranial nerves II through XII are grossly intact.  MUSCULOSKELETAL:  SKIN: No ulceration or induration present    LABS:                        10.0   46.06 )-----------( 155      ( 16 Dec 2016 06:59 )             28.6       16 Dec 2016 06:59    134    |  108    |  55.0   ----------------------------<  116    4.6     |  12.0   |  2.61     Ca    7.6        16 Dec 2016 06:59  Mg     1.5       14 Dec 2016 18:08    TPro  5.3    /  Alb  1.7    /  TBili  0.3    /  DBili  x      /  AST  12     /  ALT  9      /  AlkPhos  436    15 Dec 2016 07:15        12-16 @ 06:59  hct 28.6 % hgb 10.0 g/dL    12-16 @ 06:59  plat 155 K/uL wbc 46.06 K/uL    12-15 @ 07:15  hct 30.6 % hgb 10.8 g/dL    12-15 @ 07:15  plat 129 K/uL wbc 45.64 K/uL    12-14 @ 17:59  hct 33.4 % hgb 11.9 g/dL    12-14 @ 17:59  plat 102 K/uL wbc 45.67 K/uL    12-13 @ 19:38  hct 21.3 % hgb 7.6 g/dL    12-13 @ 19:38  plat 98 K/uL wbc 39.48 K/uL      12-16-16  creat 2.61 mg/dL gfr 23 mL/min bun 55.0 mg/dL k 4.6 mmol/L  12-15-16  creat 2.32 mg/dL gfr 26 mL/min bun 55.0 mg/dL k 3.9 mmol/L  12-13-16  creat 1.45 mg/dL gfr 46 mL/min bun 59.0 mg/dL k 3.8 mmol/L      MICROBIOLOGY:  Culture Results:   50,000 CFU/ml Gram Negative Rods Identification and susceptibility to  follow.  30,000 CFU/ml Streptococcus species Identification and susceptibility to  follow.  Culture in progress (12-14 @ 03:46)  Culture Results:   No enteric gram negative rods isolated  No enteric pathogens isolated.  (Stool culture examined for Salmonella,  Shigella, Campylobacter, Aeromonas, Plesiomonas,  Vibrio, E.coli O157 and Yersinia) (12-13 @ 21:28)  Culture Results:   No growth at 48 hours (12-13 @ 21:02)  Culture Results:   No growth at 48 hours (12-13 @ 21:02)        RADIOLOGY & ADDITIONAL STUDIES:      ASSESSMENT AND PLAN:  MOD SEVERE C DIFF  NO GNR SEPSIS!!  IF NO BETTER N AM WILL ADD VANCOMYCIN ENEMAS

## 2016-12-16 NOTE — PROGRESS NOTE ADULT - SUBJECTIVE AND OBJECTIVE BOX
GYNECOLOGIC ONCOLOGY PROGRESS NOTE      PROBLEMS:    Sepsis, due to unspecified organism  Clostridium difficile colitis  UTI (urinary tract infection)  Urinary retention  Pulmonary embolism, other  Uterine sarcoma      Pt seen and examined at bedside. Still diarrhea - 3 episodes yesterday, known C.diff colitis, on PO Vancomycin and IV Flagyl  Also UTI - pending identification  Manning removed and patient is voiding spontaneously.   Very elevated WBC 46k - unchanged.   BCx negative.   Creatinine trending up 2.6, will discuss with medicine and convert lovenox to another medication. Patient on lovenox for recent PE  No chest or abdominal pain, no dyspnea.   Ambulating with assistance, tolerating diet  Afebrile        OBJECTIVE:     VITALS:  T(F): 97.8, Max: 97.8 (12-16 @ 04:38)  HR: 71 (67 - 71)  BP: 116/71 (115/65 - 116/71)  RR: 16 (16 - 17)  SpO2: 94% (94% - 94%)  Wt(kg): --    I&O's Summary      MEDICATIONS  (STANDING):  vancomycin    Solution 250milliGRAM(s) Oral every 6 hours  dronabinol 2.5milliGRAM(s) Oral two times a day  insulin lispro (HumaLOG) corrective regimen sliding scale  SubCutaneous three times a day before meals  dextrose 5%. 1000milliLiter(s) IV Continuous <Continuous>  dextrose 50% Injectable 12.5Gram(s) IV Push once  dextrose 50% Injectable 25Gram(s) IV Push once  dextrose 50% Injectable 25Gram(s) IV Push once  metoprolol succinate ER 50milliGRAM(s) Oral daily  enoxaparin Injectable 70milliGRAM(s) SubCutaneous every 12 hours  acetaminophen    Suspension. 500milliGRAM(s) Oral once  metroNIDAZOLE  IVPB  IV Intermittent   dextrose 5%. 1000milliLiter(s) IV Continuous <Continuous>  metroNIDAZOLE  IVPB 500milliGRAM(s) IV Intermittent every 8 hours  sodium chloride 0.9% with potassium chloride 20 mEq/L 1000milliLiter(s) IV Continuous <Continuous>  piperacillin/tazobactam IVPB. 3.375Gram(s) IV Intermittent every 12 hours    MEDICATIONS  (PRN):  ondansetron Injectable 4milliGRAM(s) IV Push every 6 hours PRN Nausea and/or Vomiting  dextrose Gel 1Dose(s) Oral once PRN Blood Glucose LESS THAN 70 milliGRAM(s)/deciLiter  glucagon  Injectable 1milliGRAM(s) IntraMuscular once PRN Glucose <70 milliGRAM(s)/deciLiter      Physical Exam:  Constitutional: NAD  Pulmonary: clear to auscultation bilaterally   Cardiovascular: Regular rate and rhythm   Abdomen: soft, non-tender, non-distended, normal bowel sounds  Extremities: no lower extremity edema or calve tenderness, Samir's sign negative.        LABS:                        10.0   46.06 )-----------( 155      ( 16 Dec 2016 06:59 )             28.6     16 Dec 2016 06:59    134    |  108    |  55.0   ----------------------------<  116    4.6     |  12.0   |  2.61     Ca    7.6        16 Dec 2016 06:59  Mg     1.5       14 Dec 2016 18:08    TPro  5.3    /  Alb  1.7    /  TBili  0.3    /  DBili  x      /  AST  12     /  ALT  9      /  AlkPhos  436    15 Dec 2016 07:15          RADIOLOGY & ADDITIONAL TESTS:

## 2016-12-16 NOTE — CONSULT NOTE ADULT - SUBJECTIVE AND OBJECTIVE BOX
Patient is a 58y old  Female who presents with a chief complaint of see GYN/Onc consult note for H and P (13 Dec 2016 23:52)      HPI: + C. dif colitis . Antimicobials noted  ID follow up noted   Remains on IVF with KCL   CT noted       PAST MEDICAL & SURGICAL HISTORY:  Uterine sarcoma  Cervical cancer  Uterine cancer  Dyslipidemia  Essential hypertension, hypertension with unspecified goal  Diabetes  S/P bariatric surgery: lap band      FAMILY HISTORY:  Family history of coronary artery disease      Social History:    MEDICATIONS  (STANDING):  vancomycin    Solution 250milliGRAM(s) Oral every 6 hours  dronabinol 2.5milliGRAM(s) Oral two times a day  insulin lispro (HumaLOG) corrective regimen sliding scale  SubCutaneous three times a day before meals  dextrose 5%. 1000milliLiter(s) IV Continuous <Continuous>  dextrose 50% Injectable 12.5Gram(s) IV Push once  dextrose 50% Injectable 25Gram(s) IV Push once  dextrose 50% Injectable 25Gram(s) IV Push once  metoprolol succinate ER 50milliGRAM(s) Oral daily  metroNIDAZOLE  IVPB  IV Intermittent   dextrose 5%. 1000milliLiter(s) IV Continuous <Continuous>  metroNIDAZOLE  IVPB 500milliGRAM(s) IV Intermittent every 8 hours  enoxaparin Injectable 70milliGRAM(s) SubCutaneous daily    MEDICATIONS  (PRN):  ondansetron Injectable 4milliGRAM(s) IV Push every 6 hours PRN Nausea and/or Vomiting  dextrose Gel 1Dose(s) Oral once PRN Blood Glucose LESS THAN 70 milliGRAM(s)/deciLiter  glucagon  Injectable 1milliGRAM(s) IntraMuscular once PRN Glucose <70 milliGRAM(s)/deciLiter      Allergies    No Known Allergies    Intolerances    ALLERY AND IMMUNOLOGIC: No hives or eczema      Vital Signs Last 24 Hrs  T(C): 36.6, Max: 36.6 (12-16 @ 04:38)  T(F): 97.8, Max: 97.8 (12-16 @ 04:38)  HR: 71 (67 - 71)  BP: 116/71 (115/65 - 116/71)  BP(mean): --  RR: 16 (16 - 17)  SpO2: 94% (94% - 94%)  Daily     Daily   I&O's Detail    I&O's Summary      PHYSICAL EXAM:    GENERAL: NAD,   HEAD:  Atraumatic, Anicteric   NECK: Supple, No JVD,   CHEST/LUNG:EAE , no wheeze   HEART: Regular rate and rhythm; No rub   ABDOMEN: Soft, No rigidity or rebound   EXTREMITIES:  min edema       LABS:                        10.0   46.06 )-----------( 155      ( 16 Dec 2016 06:59 )             28.6     16 Dec 2016 06:59    134    |  108    |  55.0   ----------------------------<  116    4.6     |  12.0   |  2.61     Ca    7.6        16 Dec 2016 06:59    TPro  5.3    /  Alb  1.7    /  TBili  0.3    /  DBili  x      /  AST  12     /  ALT  9      /  AlkPhos  436    15 Dec 2016 07:15           EXAM:  CT ABDOMEN AND PELVIS OC                          PROCEDURE DATE:  12/14/2016        INTERPRETATION:  HISTORY:    Metastatic uterine sarcoma. This is a   follow-up exam..    DATE and TIME of EXAM: 12/14/2016 2:52 PM    TECHNIQUE:  Sections were obtained from the diaphragm to the pubic   symphysis with oral, but without intravenous contrast.     COMPARISON EXAMINATION:   10/1/2016.    FINDINGS:    Multiple pulmonary nodules at the lung bases. These nodules are larger   since 10/1/2016.    There is a small amount of ascites. This is a new finding since 10/1/2016.    There is a metastatic lesion in the left lobe of the liver adjacent to   the falciform ligament which is larger since the prior study measuring   2.3 versus 2.0 cm in the prior study.    Again noted is cholelithiasis as previously demonstrated.    The spleen is not enlarged and demonstrates no focal abnormality. The   pancreatic contour is unremarkable without evidence of mass, inflammation   or ductal dilatation. The adrenal glands demonstrate normal size and   contour.    Mild left hydronephrosis and hydroureter, unchanged. More significant   right hydronephrosis and hydroureter, also unchanged.    No significant retroperitoneal lymphadenopathy. Mildly enlarged left   para-aortic lymph nodes, unchanged. No definite pelvic adenopathy. A   Manning catheter is noted in the urinary bladder. There is diffuse   thickening of the bladder wall. There is enlargement of the uterus. There   is now diffuse thickening of the rectal wall which is a new finding since   the prior examination. In addition there is diffuse thickening of the   wall of the sigmoid, descending colon, splenic flexure. These findings   are new.    Again noted is a gastric band.    There are degenerative changes in the spine.    IMPRESSION:    Pulmonary metastases visualized at the lung bases, larger since 10/1/2016.    Ascites, a new finding.    Left lobe liver metastasis, larger.    Bilateral hydronephrosis and hydroureter, unchanged.    Uterine enlargement, unchanged.    Evidence of diffuse colitis involving the splenic flexure, descending   colon, sigmoid, and rectum..                JAMES FREGOSO M.D., ATTENDING RADIOLOGIST  This document has been electronically signed. Dec 14 [id number]:[id number]                        RADIOLOGY & ADDITIONAL TESTS:

## 2016-12-16 NOTE — PROGRESS NOTE ADULT - SUBJECTIVE AND OBJECTIVE BOX
HPI:  57 yo, history of uterine sarcoma, came in for anemia, weakness and suprapubic pain and multiple bowel movements over past 2 days, poor appetite 20lb wt loss       Allergies    No Known Allergies    Intolerances      Uterine sarcoma  Cervical cancer  Uterine cancer  Dyslipidemia  Essential hypertension, hypertension with unspecified goal  Diabetes    MEDICATIONS  (STANDING):  vancomycin    Solution 250milliGRAM(s) Oral every 6 hours  dronabinol 2.5milliGRAM(s) Oral two times a day  insulin lispro (HumaLOG) corrective regimen sliding scale  SubCutaneous three times a day before meals  dextrose 5%. 1000milliLiter(s) IV Continuous <Continuous>  dextrose 50% Injectable 12.5Gram(s) IV Push once  dextrose 50% Injectable 25Gram(s) IV Push once  dextrose 50% Injectable 25Gram(s) IV Push once  metoprolol succinate ER 50milliGRAM(s) Oral daily  metroNIDAZOLE  IVPB  IV Intermittent   dextrose 5%. 1000milliLiter(s) IV Continuous <Continuous>  metroNIDAZOLE  IVPB 500milliGRAM(s) IV Intermittent every 8 hours  sodium chloride 0.9% with potassium chloride 20 mEq/L 1000milliLiter(s) IV Continuous <Continuous>  enoxaparin Injectable 70milliGRAM(s) SubCutaneous daily    MEDICATIONS  (PRN):  ondansetron Injectable 4milliGRAM(s) IV Push every 6 hours PRN Nausea and/or Vomiting  dextrose Gel 1Dose(s) Oral once PRN Blood Glucose LESS THAN 70 milliGRAM(s)/deciLiter  glucagon  Injectable 1milliGRAM(s) IntraMuscular once PRN Glucose <70 milliGRAM(s)/deciLiter                           10.0   46.06 )-----------( 155      ( 16 Dec 2016 06:59 )             28.6     16 Dec 2016 06:59    134    |  108    |  55.0   ----------------------------<  116    4.6     |  12.0   |  2.61     Ca    7.6        16 Dec 2016 06:59    TPro  5.3    /  Alb  1.7    /  TBili  0.3    /  DBili  x      /  AST  12     /  ALT  9      /  AlkPhos  436    15 Dec 2016 07:15      ;  Vital Signs Last 24 Hrs  T(C): 36.6, Max: 36.6 (12-16 @ 04:38)  T(F): 97.8, Max: 97.8 (12-16 @ 04:38)  HR: 71 (67 - 71)  BP: 116/71 (115/65 - 116/71)  BP(mean): --  RR: 16 (16 - 17)  SpO2: 94% (94% - 94%)  CAPILLARY BLOOD GLUCOSE  125 (16 Dec 2016 12:00)  108 (16 Dec 2016 08:00)  158 (15 Dec 2016 22:05)      I & Os for current day (as of 12-16 @ 18:27)  =============================================  IN: 2430 ml / OUT: 0 ml / NET: 2430 ml    Pt in bed feeling better Diarrhea less frequent No SOB, CP, Abd pain    HEENT: PEARLA  Neck: Supple  Cardio: S1 S2 No Murmur  Pulm: CTA No Rales or Ronchi  Abd: Soft NT mild distension BS+ No rebound  Rectal Pelvic Breast - refused  Ext: No DCT  Skin: No Rash  Neuro: Awake Pleasant    C diff colitis- po vanco, IV flagyl  PE - lovenox theraputic renally adjusted  DM - SS  HTN- meds with parameters  Uterine Sarcoma - chemo 1 week ago  Renal Failure - Renal called cont iv hydration mild increase in Urine output

## 2016-12-17 NOTE — PROGRESS NOTE ADULT - SUBJECTIVE AND OBJECTIVE BOX
GYNECOLOGIC ONCOLOGY PROGRESS NOTE      PROBLEMS:  Sepsis, due to unspecified organism  Clostridium difficile colitis  UTI (urinary tract infection)  Urinary retention  Pulmonary embolism, other  Uterine sarcoma      Pt seen and examined at bedside.     SUBJECTIVE:    Patient is without complaints.  Pain well-controlled.  Flatus: yes and loose diarrhea.   Denies Nausea, or vomiting.   Denies shortness of breath, chest pain or dyspnea on exertion.  Tolerating diet.    OBJECTIVE:     VITALS:  T(F): 97.6, Max: 98 (12-16 @ 23:26)  HR: 67 (67 - 71)  BP: 127/68 (127/68 - 135/78)  RR: 19 (18 - 20)  SpO2: 99% (96% - 99%)  Wt(kg): --    I&O's Summary      MEDICATIONS  (STANDING):  vancomycin    Solution 250milliGRAM(s) Oral every 6 hours  dronabinol 2.5milliGRAM(s) Oral two times a day  insulin lispro (HumaLOG) corrective regimen sliding scale  SubCutaneous three times a day before meals  dextrose 5%. 1000milliLiter(s) IV Continuous <Continuous>  dextrose 50% Injectable 12.5Gram(s) IV Push once  dextrose 50% Injectable 25Gram(s) IV Push once  dextrose 50% Injectable 25Gram(s) IV Push once  metoprolol succinate ER 50milliGRAM(s) Oral daily  metroNIDAZOLE  IVPB  IV Intermittent   dextrose 5%. 1000milliLiter(s) IV Continuous <Continuous>  metroNIDAZOLE  IVPB 500milliGRAM(s) IV Intermittent every 8 hours  enoxaparin Injectable 70milliGRAM(s) SubCutaneous daily  sodium chloride 0.45% 1000milliLiter(s) IV Continuous <Continuous>    MEDICATIONS  (PRN):  ondansetron Injectable 4milliGRAM(s) IV Push every 6 hours PRN Nausea and/or Vomiting  dextrose Gel 1Dose(s) Oral once PRN Blood Glucose LESS THAN 70 milliGRAM(s)/deciLiter  glucagon  Injectable 1milliGRAM(s) IntraMuscular once PRN Glucose <70 milliGRAM(s)/deciLiter  acetaminophen   Tablet. 650milliGRAM(s) Oral every 6 hours PRN headache/pain      Physical Exam:  Constitutional: NAD  Pulmonary: clear to auscultation bilaterally   Cardiovascular: Regular rate and rhythm   Abdomen: soft, non-tender, non-distended, normal bowel sounds  Extremities: no lower extremity edema or calf tenderness, Samir's sign negative.        LABS:                        10.0   46.06 )-----------( 155      ( 16 Dec 2016 06:59 )             28.6     17 Dec 2016 08:14    139    |  112    |  47.0   ----------------------------<  108    4.3     |  14.0   |  2.52     Ca    7.3        17 Dec 2016 08:14  Mg     1.3       17 Dec 2016 08:14

## 2016-12-17 NOTE — PROGRESS NOTE ADULT - ASSESSMENT
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 Diff colitis with stool losses ,   Metastatic Uterine Sarcoma, has B/L [place] on CT with h/o retention folley out at present   pt did not want it back in "wants more time to pee"  but agrees to keep folley if next bladder scan has 200cc at 2pm  Continue IV Flagyl and enteral vanco  Gyn-Onc follow up noted   Metabolic Acidosis 2/2 CADEN cont IVF   1/2 NS w 75meq and 20meqKCL at 125 cc/hr  Bladder scan post void to assess residual   Trend labs   Currently her MDRD GFR is 15 ml/min , so current dose of Lovenox is reasonable ( h/o PE ) CADEN - 2/2 hypoperfusion due to  C. 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

## 2016-12-17 NOTE — CONSULT NOTE ADULT - SUBJECTIVE AND OBJECTIVE BOX
HPI: Patient with UTI, Patient has groin swelling and redness.  Staff unable to place a saul therefore  was asked to eval.  Patient reports she voids via dribbling of urine.  No hematuria.  No painful voiding.         PAST MEDICAL & SURGICAL HISTORY:  Uterine sarcoma  Cervical cancer  Uterine cancer  Dyslipidemia  Essential hypertension, hypertension with unspecified goal  Diabetes  S/P bariatric surgery: lap band      REVIEW OF SYSTEMS:    Reviewed H and P ROS    MEDICATIONS  (STANDING):  vancomycin    Solution 250milliGRAM(s) Oral every 6 hours  dronabinol 2.5milliGRAM(s) Oral two times a day  insulin lispro (HumaLOG) corrective regimen sliding scale  SubCutaneous three times a day before meals  dextrose 5%. 1000milliLiter(s) IV Continuous <Continuous>  dextrose 50% Injectable 12.5Gram(s) IV Push once  dextrose 50% Injectable 25Gram(s) IV Push once  dextrose 50% Injectable 25Gram(s) IV Push once  metoprolol succinate ER 50milliGRAM(s) Oral daily  metroNIDAZOLE  IVPB  IV Intermittent   dextrose 5%. 1000milliLiter(s) IV Continuous <Continuous>  metroNIDAZOLE  IVPB 500milliGRAM(s) IV Intermittent every 8 hours  enoxaparin Injectable 70milliGRAM(s) SubCutaneous daily  vancomycin  Retention Enema 500milliGRAM(s) Rectal four times a day  sodium chloride 0.45% 1000milliLiter(s) IV Continuous <Continuous>    MEDICATIONS  (PRN):  ondansetron Injectable 4milliGRAM(s) IV Push every 6 hours PRN Nausea and/or Vomiting  dextrose Gel 1Dose(s) Oral once PRN Blood Glucose LESS THAN 70 milliGRAM(s)/deciLiter  glucagon  Injectable 1milliGRAM(s) IntraMuscular once PRN Glucose <70 milliGRAM(s)/deciLiter  acetaminophen   Tablet. 650milliGRAM(s) Oral every 6 hours PRN headache/pain      Allergies    No Known Allergies    Intolerances        SOCIAL HISTORY:    FAMILY HISTORY:  Family history of coronary artery disease      Vital Signs Last 24 Hrs  T(C): 36.4, Max: 36.4 (12-17 @ 08:44)  T(F): 97.6, Max: 97.6 (12-17 @ 08:44)  HR: 60 (60 - 68)  BP: 132/59 (121/62 - 132/59)  BP(mean): --  RR: 18 (18 - 19)  SpO2: 99% (99% - 99%)    PHYSICAL EXAM:    Abd- soft, mild distention, Mild fullness and tenderness of the lower abdomen    - Fungal groin infection    Saul was placed without significant difficulty.  Patient's groin is tender secondary to her fungal skin infection and possible superimposed cellulitis    LABS:                        9.9    50.08 )-----------( 223      ( 17 Dec 2016 08:14 )             27.7     17 Dec 2016 08:14    139    |  112    |  47.0   ----------------------------<  108    4.3     |  14.0   |  2.52     Ca    7.3        17 Dec 2016 08:14  Mg     1.3       17 Dec 2016 08:14            RADIOLOGY & ADDITIONAL STUDIES:

## 2016-12-17 NOTE — PROGRESS NOTE ADULT - SUBJECTIVE AND OBJECTIVE BOX
NEPHROLOGY INTERVAL HPI/OVERNIGHT EVENTS:    380cc on bladder scan states that she is urinating   has diaper, incontinent       MEDICATIONS  (STANDING):  vancomycin    Solution 250milliGRAM(s) Oral every 6 hours  dronabinol 2.5milliGRAM(s) Oral two times a day  insulin lispro (HumaLOG) corrective regimen sliding scale  SubCutaneous three times a day before meals  dextrose 5%. 1000milliLiter(s) IV Continuous <Continuous>  dextrose 50% Injectable 12.5Gram(s) IV Push once  dextrose 50% Injectable 25Gram(s) IV Push once  dextrose 50% Injectable 25Gram(s) IV Push once  metoprolol succinate ER 50milliGRAM(s) Oral daily  metroNIDAZOLE  IVPB  IV Intermittent   dextrose 5%. 1000milliLiter(s) IV Continuous <Continuous>  metroNIDAZOLE  IVPB 500milliGRAM(s) IV Intermittent every 8 hours  enoxaparin Injectable 70milliGRAM(s) SubCutaneous daily  sodium chloride 0.45% 1000milliLiter(s) IV Continuous <Continuous>  vancomycin  Retention Enema 500milliGRAM(s) Rectal four times a day    MEDICATIONS  (PRN):  ondansetron Injectable 4milliGRAM(s) IV Push every 6 hours PRN Nausea and/or Vomiting  dextrose Gel 1Dose(s) Oral once PRN Blood Glucose LESS THAN 70 milliGRAM(s)/deciLiter  glucagon  Injectable 1milliGRAM(s) IntraMuscular once PRN Glucose <70 milliGRAM(s)/deciLiter  acetaminophen   Tablet. 650milliGRAM(s) Oral every 6 hours PRN headache/pain      Allergies    No Known Allergies    Intolerances        Vital Signs Last 24 Hrs  T(C): 36.4, Max: 36.7 (12-16 @ 23:26)  T(F): 97.6, Max: 98 (12-16 @ 23:26)  HR: 67 (67 - 71)  BP: 127/68 (127/68 - 135/78)  BP(mean): --  RR: 19 (18 - 20)  SpO2: 99% (96% - 99%)    PHYSICAL EXAM:  GENERAL: NAD,   HEAD:  Atraumatic, Anicteric   NECK: Supple, No JVD,   CHEST/LUNG:EAE , no wheeze   HEART: Regular rate and rhythm; No rub   ABDOMEN: Soft, No rigidity or rebound   EXTREMITIES:  min edema                               9.9    50.08 )-----------( 223      ( 17 Dec 2016 08:14 )             27.7     17 Dec 2016 08:14    139    |  112    |  47.0   ----------------------------<  108    4.3     |  14.0   |  2.52     Ca    7.3        17 Dec 2016 08:14  Mg     1.3       17 Dec 2016 08:14          Magnesium, Serum: 1.3 mg/dL (12-17 @ 08:14)          RADIOLOGY & ADDITIONAL TESTS: NEPHROLOGY INTERVAL HPI/OVERNIGHT EVENTS:    380cc on bladder scan states that she is urinating   has diaper, incontinent         MEDICATIONS  (STANDING):  vancomycin    Solution 250milliGRAM(s) Oral every 6 hours  dronabinol 2.5milliGRAM(s) Oral two times a day  insulin lispro (HumaLOG) corrective regimen sliding scale  SubCutaneous three times a day before meals  dextrose 5%. 1000milliLiter(s) IV Continuous <Continuous>  dextrose 50% Injectable 12.5Gram(s) IV Push once  dextrose 50% Injectable 25Gram(s) IV Push once  dextrose 50% Injectable 25Gram(s) IV Push once  metoprolol succinate ER 50milliGRAM(s) Oral daily  metroNIDAZOLE  IVPB  IV Intermittent   dextrose 5%. 1000milliLiter(s) IV Continuous <Continuous>  metroNIDAZOLE  IVPB 500milliGRAM(s) IV Intermittent every 8 hours  enoxaparin Injectable 70milliGRAM(s) SubCutaneous daily  sodium chloride 0.45% 1000milliLiter(s) IV Continuous <Continuous>  vancomycin  Retention Enema 500milliGRAM(s) Rectal four times a day    MEDICATIONS  (PRN):  ondansetron Injectable 4milliGRAM(s) IV Push every 6 hours PRN Nausea and/or Vomiting  dextrose Gel 1Dose(s) Oral once PRN Blood Glucose LESS THAN 70 milliGRAM(s)/deciLiter  glucagon  Injectable 1milliGRAM(s) IntraMuscular once PRN Glucose <70 milliGRAM(s)/deciLiter  acetaminophen   Tablet. 650milliGRAM(s) Oral every 6 hours PRN headache/pain      Allergies    No Known Allergies    Intolerances        Vital Signs Last 24 Hrs  T(C): 36.4, Max: 36.7 (12-16 @ 23:26)  T(F): 97.6, Max: 98 (12-16 @ 23:26)  HR: 67 (67 - 71)  BP: 127/68 (127/68 - 135/78)  BP(mean): --  RR: 19 (18 - 20)  SpO2: 99% (96% - 99%)    PHYSICAL EXAM:  GENERAL: NAD,   HEAD:  Atraumatic, Anicteric   NECK: Supple, No JVD,   CHEST/LUNG:EAE , no wheeze   HEART: Regular rate and rhythm; No rub   ABDOMEN: Soft, No rigidity or rebound   EXTREMITIES:  min edema                               9.9    50.08 )-----------( 223      ( 17 Dec 2016 08:14 )             27.7     17 Dec 2016 08:14    139    |  112    |  47.0   ----------------------------<  108    4.3     |  14.0   |  2.52     Ca    7.3        17 Dec 2016 08:14  Mg     1.3       17 Dec 2016 08:14          Magnesium, Serum: 1.3 mg/dL (12-17 @ 08:14)          RADIOLOGY & ADDITIONAL TESTS:

## 2016-12-17 NOTE — PROGRESS NOTE ADULT - SUBJECTIVE AND OBJECTIVE BOX
[facility] [place] SAI COLLINS MD FACP   JARED NORTON MD  Diplomates American Board of Internal Medicine and Infectious Diseases      TRAN MCDONALD  MRN-[id number]  58y    INTERVAL HPI/OVERNIGHT EVENTS:  afebrile  given incorreect info re pos blood c.s gnr - all blood c.s neg and iv abs stopped  pt persists with diarrhea but maybe sl less  min abd pain  4-8x/day  DIARRHEA PERSISTS 8X/DAY    Vital Signs Last 24 Hrs  T(C): 36.6, Max: 36.6 (12-16 @ 04:38)  T(F): 97.8, Max: 97.8 (12-16 @ 04:38)  HR: 71 (67 - 71)  BP: 116/71 (115/65 - 116/71)  BP(mean): --  RR: 16 (16 - 17)  SpO2: 94% (94% - 94%)    ANTIBIOTICS  vancomycin    Solution 250milliGRAM(s) Oral every 6 hours  metroNIDAZOLE  IVPB  IV Intermittent   metroNIDAZOLE  IVPB 500milliGRAM(s) IV Intermittent every 8 hours        REVIEW OF SYSTEMS:DIARRHEA    PHYSICAL EXAM:  Vital Signs Last 24 Hrs  T(C): 36.6, Max: 36.6 (12-16 @ 04:38)  T(F): 97.8, Max: 97.8 (12-16 @ 04:38)  HR: 71 (67 - 71)  BP: 116/71 (115/65 - 116/71)  BP(mean): --  RR: 16 (16 - 17)  SpO2: 94% (94% - 94%)      GEN: NAD, pleasant  HEENT: normocephalic and atraumatic. EOMI. IVÁN. Moist mucosa. Clear Posterior pharynx.  NECK: Supple. No carotid bruits.  No lymphadenopathy or thyromegaly.  LUNGS: Clear to auscultation.  HEART: Regular rate and rhythm without murmur.  ABDOMEN: Soft, nontender, and nondistended.  Positive bowel sounds.  No hepatosplenomegaly was noted.NO GUARDING. PERSITENT LLQ PAIN  EXTREMITIES: Without any cyanosis, clubbing, rash, lesions or edema.  NEUROLOGIC: Cranial nerves II through XII are grossly intact.  MUSCULOSKELETAL:  SKIN: No ulceration or induration present    LABS:                        10.0   46.06 )-----------( 155      ( 16 Dec 2016 06:59 )             28.6       16 Dec 2016 06:59    134    |  108    |  55.0   ----------------------------<  116    4.6     |  12.0   |  2.61     Ca    7.6        16 Dec 2016 06:59  Mg     1.5       14 Dec 2016 18:08    TPro  5.3    /  Alb  1.7    /  TBili  0.3    /  DBili  x      /  AST  12     /  ALT  9      /  AlkPhos  436    15 Dec 2016 07:15        12-16 @ 06:59  hct 28.6 % hgb 10.0 g/dL    12-16 @ 06:59  plat 155 K/uL wbc 46.06 K/uL    12-15 @ 07:15  hct 30.6 % hgb 10.8 g/dL    12-15 @ 07:15  plat 129 K/uL wbc 45.64 K/uL    12-14 @ 17:59  hct 33.4 % hgb 11.9 g/dL    12-14 @ 17:59  plat 102 K/uL wbc 45.67 K/uL    12-13 @ 19:38  hct 21.3 % hgb 7.6 g/dL    12-13 @ 19:38  plat 98 K/uL wbc 39.48 K/uL      12-16-16  creat 2.61 mg/dL gfr 23 mL/min bun 55.0 mg/dL k 4.6 mmol/L  12-15-16  creat 2.32 mg/dL gfr 26 mL/min bun 55.0 mg/dL k 3.9 mmol/L  12-13-16  creat 1.45 mg/dL gfr 46 mL/min bun 59.0 mg/dL k 3.8 mmol/L      MICROBIOLOGY:  Culture Results:   50,000 CFU/ml Gram Negative Rods Identification and susceptibility to  follow.  30,000 CFU/ml Streptococcus species Identification and susceptibility to  follow.  Culture in progress (12-14 @ 03:46)  Culture Results:   No enteric gram negative rods isolated  No enteric pathogens isolated.  (Stool culture examined for Salmonella,  Shigella, Campylobacter, Aeromonas, Plesiomonas,  Vibrio, E.coli O157 and Yersinia) (12-13 @ 21:28)  Culture Results:   No growth at 48 hours (12-13 @ 21:02)  Culture Results:   No growth at 48 hours (12-13 @ 21:02)        RADIOLOGY & ADDITIONAL STUDIES:      ASSESSMENT AND PLAN:  PERSISTENT LEUKOCYTOSIS AND C DIFF  NO EVID ISSUE ELSEWHERE  BLOOD C/S NEG  D/W PT  WILL ADD VANCO ENEMAS  WILL REPEAT CT IF NO CHANGE WBC 48HRS  PT NON TOXIC

## 2016-12-18 NOTE — PROGRESS NOTE ADULT - SUBJECTIVE AND OBJECTIVE BOX
[facility] [place] SAI COLLINS MD FACP   JARED NORTON MD  Diplomates American Board of Internal Medicine and Infectious Diseases      TRAN MCDONALD  MRN-[id number]  58y    INTERVAL HPI/OVERNIGHT EVENTS:  afebrile  given incorreect info re pos blood c.s gnr - all blood c.s neg and iv abs stopped  pt persists with diarrhea but maybe sl less  min abd pain  4-8x/day  DIARRHEA PERSISTS 4x/day- SIGNIF BETTER ON VANCO ENEMAS    Vital Signs Last 24 Hrs  T(C): 36.6, Max: 36.6 (12-16 @ 04:38)  T(F): 97.8, Max: 97.8 (12-16 @ 04:38)  HR: 71 (67 - 71)  BP: 116/71 (115/65 - 116/71)  BP(mean): --  RR: 16 (16 - 17)  SpO2: 94% (94% - 94%)    ANTIBIOTICS  vancomycin    Solution 250milliGRAM(s) Oral every 6 hours  metroNIDAZOLE  IVPB  IV Intermittent   metroNIDAZOLE  IVPB 500milliGRAM(s) IV Intermittent every 8 hours        REVIEW OF SYSTEMS:DIARRHEA    PHYSICAL EXAM:  Vital Signs Last 24 Hrs  T(C): 36.6, Max: 36.6 (12-16 @ 04:38)  T(F): 97.8, Max: 97.8 (12-16 @ 04:38)  HR: 71 (67 - 71)  BP: 116/71 (115/65 - 116/71)  BP(mean): --  RR: 16 (16 - 17)  SpO2: 94% (94% - 94%)      GEN: NAD, pleasant  HEENT: normocephalic and atraumatic. EOMI. IVÁN. Moist mucosa. Clear Posterior pharynx.  NECK: Supple. No carotid bruits.  No lymphadenopathy or thyromegaly.  LUNGS: Clear to auscultation.  HEART: Regular rate and rhythm without murmur.  ABDOMEN: Soft, nontender, and nondistended.  Positive bowel sounds.  No hepatosplenomegaly was noted.NO GUARDING. NO LLQ PAIN  EXTREMITIES: Without any cyanosis, clubbing, rash, lesions or edema.  NEUROLOGIC: Cranial nerves II through XII are grossly intact.  MUSCULOSKELETAL:  SKIN: No ulceration or induration present    LABS:                        10.0   46.06 )-----------( 155      ( 16 Dec 2016 06:59 )             28.6       16 Dec 2016 06:59    134    |  108    |  55.0   ----------------------------<  116    4.6     |  12.0   |  2.61     Ca    7.6        16 Dec 2016 06:59  Mg     1.5       14 Dec 2016 18:08    TPro  5.3    /  Alb  1.7    /  TBili  0.3    /  DBili  x      /  AST  12     /  ALT  9      /  AlkPhos  436    15 Dec 2016 07:15        12-16 @ 06:59  hct 28.6 % hgb 10.0 g/dL    12-16 @ 06:59  plat 155 K/uL wbc 46.06 K/uL    12-15 @ 07:15  hct 30.6 % hgb 10.8 g/dL    12-15 @ 07:15  plat 129 K/uL wbc 45.64 K/uL    12-14 @ 17:59  hct 33.4 % hgb 11.9 g/dL    12-14 @ 17:59  plat 102 K/uL wbc 45.67 K/uL    12-13 @ 19:38  hct 21.3 % hgb 7.6 g/dL    12-13 @ 19:38  plat 98 K/uL wbc 39.48 K/uL      12-16-16  creat 2.61 mg/dL gfr 23 mL/min bun 55.0 mg/dL k 4.6 mmol/L  12-15-16  creat 2.32 mg/dL gfr 26 mL/min bun 55.0 mg/dL k 3.9 mmol/L  12-13-16  creat 1.45 mg/dL gfr 46 mL/min bun 59.0 mg/dL k 3.8 mmol/L      MICROBIOLOGY:  Culture Results:   50,000 CFU/ml Gram Negative Rods Identification and susceptibility to  follow.  30,000 CFU/ml Streptococcus species Identification and susceptibility to  follow.  Culture in progress (12-14 @ 03:46)  Culture Results:   No enteric gram negative rods isolated  No enteric pathogens isolated.  (Stool culture examined for Salmonella,  Shigella, Campylobacter, Aeromonas, Plesiomonas,  Vibrio, E.coli O157 and Yersinia) (12-13 @ 21:28)  Culture Results:   No growth at 48 hours (12-13 @ 21:02)  Culture Results:   No growth at 48 hours (12-13 @ 21:02)        RADIOLOGY & ADDITIONAL STUDIES:      ASSESSMENT AND PLAN:  WBC DOWN MID 30S AND C DIFF  NO EVID ISSUE ELSEWHERE  BLOOD C/S NEG  D/W PT  VANCO ENEAMS WITH IMPROVEMENT  D/C IV FLAGYL 24 HRS  ENEMAS X 5 DAYS  PO VANCO 7-10 DAYS  PT NON TOXIC

## 2016-12-18 NOTE — PROGRESS NOTE ADULT - SUBJECTIVE AND OBJECTIVE BOX
INTERVAL HPI/OVERNIGHT EVENTS: Decreased lower abd pain since saul was placed.    MEDICATIONS  (STANDING):  vancomycin    Solution 250milliGRAM(s) Oral every 6 hours  dronabinol 2.5milliGRAM(s) Oral two times a day  insulin lispro (HumaLOG) corrective regimen sliding scale  SubCutaneous three times a day before meals  dextrose 5%. 1000milliLiter(s) IV Continuous <Continuous>  dextrose 50% Injectable 12.5Gram(s) IV Push once  dextrose 50% Injectable 25Gram(s) IV Push once  dextrose 50% Injectable 25Gram(s) IV Push once  metoprolol succinate ER 50milliGRAM(s) Oral daily  metroNIDAZOLE  IVPB  IV Intermittent   dextrose 5%. 1000milliLiter(s) IV Continuous <Continuous>  metroNIDAZOLE  IVPB 500milliGRAM(s) IV Intermittent every 8 hours  enoxaparin Injectable 70milliGRAM(s) SubCutaneous daily  vancomycin  Retention Enema 500milliGRAM(s) Rectal four times a day  sodium chloride 0.45% 1000milliLiter(s) IV Continuous <Continuous>  tamsulosin 0.4milliGRAM(s) Oral at bedtime    MEDICATIONS  (PRN):  ondansetron Injectable 4milliGRAM(s) IV Push every 6 hours PRN Nausea and/or Vomiting  dextrose Gel 1Dose(s) Oral once PRN Blood Glucose LESS THAN 70 milliGRAM(s)/deciLiter  glucagon  Injectable 1milliGRAM(s) IntraMuscular once PRN Glucose <70 milliGRAM(s)/deciLiter  acetaminophen   Tablet. 650milliGRAM(s) Oral every 6 hours PRN headache/pain      Allergies    No Known Allergies    Intolerances        Vital Signs Last 24 Hrs  T(C): 36.4, Max: 36.4 (12-17 @ 22:49)  T(F): 97.6, Max: 97.6 (12-17 @ 22:49)  HR: 68 (60 - 68)  BP: 131/72 (126/62 - 132/59)  BP(mean): --  RR: 18 (18 - 18)  SpO2: 98% (98% - 98%)     ON PE:  General: alert and awake  Abdomen: mild suprapubic discomfort       LABS:                        9.9    36.53 )-----------( 245      ( 18 Dec 2016 08:17 )             28.3     18 Dec 2016 08:15    142    |  113    |  34.0   ----------------------------<  101    3.8     |  15.0   |  1.47     Ca    7.5        18 Dec 2016 08:15  Mg     1.4       18 Dec 2016 08:15            RADIOLOGY & ADDITIONAL TESTS:

## 2016-12-18 NOTE — PROGRESS NOTE ADULT - SUBJECTIVE AND OBJECTIVE BOX
HPI:     Allergies    No Known Allergies    Intolerances      Uterine sarcoma  Cervical cancer  Uterine cancer  Dyslipidemia  Essential hypertension, hypertension with unspecified goal  Diabetes    MEDICATIONS  (STANDING):  vancomycin    Solution 250milliGRAM(s) Oral every 6 hours  dronabinol 2.5milliGRAM(s) Oral two times a day  insulin lispro (HumaLOG) corrective regimen sliding scale  SubCutaneous three times a day before meals  dextrose 5%. 1000milliLiter(s) IV Continuous <Continuous>  dextrose 50% Injectable 12.5Gram(s) IV Push once  dextrose 50% Injectable 25Gram(s) IV Push once  dextrose 50% Injectable 25Gram(s) IV Push once  metoprolol succinate ER 50milliGRAM(s) Oral daily  metroNIDAZOLE  IVPB  IV Intermittent   dextrose 5%. 1000milliLiter(s) IV Continuous <Continuous>  metroNIDAZOLE  IVPB 500milliGRAM(s) IV Intermittent every 8 hours  enoxaparin Injectable 70milliGRAM(s) SubCutaneous daily  vancomycin  Retention Enema 500milliGRAM(s) Rectal four times a day  sodium chloride 0.45% 1000milliLiter(s) IV Continuous <Continuous>  tamsulosin 0.4milliGRAM(s) Oral at bedtime    MEDICATIONS  (PRN):  ondansetron Injectable 4milliGRAM(s) IV Push every 6 hours PRN Nausea and/or Vomiting  dextrose Gel 1Dose(s) Oral once PRN Blood Glucose LESS THAN 70 milliGRAM(s)/deciLiter  glucagon  Injectable 1milliGRAM(s) IntraMuscular once PRN Glucose <70 milliGRAM(s)/deciLiter  acetaminophen   Tablet. 650milliGRAM(s) Oral every 6 hours PRN headache/pain                           9.9    36.53 )-----------( 245      ( 18 Dec 2016 08:17 )             28.3     18 Dec 2016 08:15    142    |  113    |  34.0   ----------------------------<  101    3.8     |  15.0   |  1.47     Ca    7.5        18 Dec 2016 08:15  Mg     1.4       18 Dec 2016 08:15        ;  Vital Signs Last 24 Hrs  T(C): 36.6, Max: 36.6 (12-18 @ 16:45)  T(F): 97.8, Max: 97.8 (12-18 @ 16:45)  HR: 88 (60 - 88)  BP: 129/60 (126/62 - 132/59)  BP(mean): --  RR: 20 (18 - 20)  SpO2: 98% (98% - 98%)  CAPILLARY BLOOD GLUCOSE  159 (18 Dec 2016 16:45)  139 (18 Dec 2016 11:44)  101 (18 Dec 2016 08:55)    I & Os for 24h ending 12-18 @ 07:00  =============================================  IN: 2300 ml / OUT: 2850 ml / NET: -550 ml    I & Os for current day (as of 12-18 @ 20:44)  =============================================  IN: 3510 ml / OUT: 700 ml / NET: 2810 ml    Pt in bed feeling better Diarrhea less frequent No SOB, CP, minimal Abd pain    HEENT: PEARLA  Neck: Supple  Cardio: S1 S2 No Murmur  Pulm: CTA No Rales or Ronchi  Abd: Soft NT mild distension BS+ No rebound  Rectal Pelvic Breast - refused  Ext: No DCT  Skin: No Rash  Neuro: Awake Pleasant    C diff colitis- po rectal vanco, IV flagyl  PE - lovenox  DM - SS  HTN- meds with parameters  Uterine Sarcoma - chemo 1 week ago  Renal Failure - improved

## 2016-12-18 NOTE — PROGRESS NOTE ADULT - SUBJECTIVE AND OBJECTIVE BOX
NEPHROLOGY INTERVAL HPI/OVERNIGHT EVENTS:    UOP improved with folley   This 7AM 2850cc/ 24hr    MEDICATIONS  (STANDING):  vancomycin    Solution 250milliGRAM(s) Oral every 6 hours  dronabinol 2.5milliGRAM(s) Oral two times a day  insulin lispro (HumaLOG) corrective regimen sliding scale  SubCutaneous three times a day before meals  dextrose 5%. 1000milliLiter(s) IV Continuous <Continuous>  dextrose 50% Injectable 12.5Gram(s) IV Push once  dextrose 50% Injectable 25Gram(s) IV Push once  dextrose 50% Injectable 25Gram(s) IV Push once  metoprolol succinate ER 50milliGRAM(s) Oral daily  metroNIDAZOLE  IVPB  IV Intermittent   dextrose 5%. 1000milliLiter(s) IV Continuous <Continuous>  metroNIDAZOLE  IVPB 500milliGRAM(s) IV Intermittent every 8 hours  enoxaparin Injectable 70milliGRAM(s) SubCutaneous daily  vancomycin  Retention Enema 500milliGRAM(s) Rectal four times a day  sodium chloride 0.45% 1000milliLiter(s) IV Continuous <Continuous>  tamsulosin 0.4milliGRAM(s) Oral at bedtime    MEDICATIONS  (PRN):  ondansetron Injectable 4milliGRAM(s) IV Push every 6 hours PRN Nausea and/or Vomiting  dextrose Gel 1Dose(s) Oral once PRN Blood Glucose LESS THAN 70 milliGRAM(s)/deciLiter  glucagon  Injectable 1milliGRAM(s) IntraMuscular once PRN Glucose <70 milliGRAM(s)/deciLiter  acetaminophen   Tablet. 650milliGRAM(s) Oral every 6 hours PRN headache/pain      Allergies    No Known Allergies    Intolerances        Vital Signs Last 24 Hrs  T(C): 36.4, Max: 36.4 (12-17 @ 22:49)  T(F): 97.6, Max: 97.6 (12-17 @ 22:49)  HR: 68 (60 - 68)  BP: 131/72 (121/62 - 132/59)  BP(mean): --  RR: 18 (18 - 18)  SpO2: 98% (98% - 99%)    PHYSICAL EXAM:  GENERAL: NAD,   HEAD:  Atraumatic, Anicteric   NECK: Supple, No JVD,   CHEST/LUNG:EAE , no wheeze   HEART: Regular rate and rhythm; No rub   ABDOMEN: Soft, No rigidity or rebound   EXTREMITIES:  min edema                    9.9    36.53 )-----------( 245      ( 18 Dec 2016 08:17 )             28.3     18 Dec 2016 08:15    142    |  113    |  34.0   ----------------------------<  101    3.8     |  15.0   |  1.47     Ca    7.5        18 Dec 2016 08:15  Mg     1.4       18 Dec 2016 08:15          Magnesium, Serum: 1.4 mg/dL (12-18 @ 08:15)          RADIOLOGY & ADDITIONAL TESTS:

## 2016-12-18 NOTE — PROGRESS NOTE ADULT - ASSESSMENT
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

## 2016-12-18 NOTE — PROGRESS NOTE ADULT - SUBJECTIVE AND OBJECTIVE BOX
GYNECOLOGIC ONCOLOGY PROGRESS NOTE      PROBLEMS:  Urinary tract infection without hematuria, site unspecified  Sepsis, due to unspecified organism  Clostridium difficile colitis  UTI (urinary tract infection)  Urinary retention  Pulmonary embolism, other  Uterine sarcoma      Pt seen and examined at bedside.     SUBJECTIVE:    Patient is without complaints.  Pain well-controlled.  Flatus: yes and having bowel movements.   Denies Nausea, Vomiting. Diarrhea improving.   Denies shortness of breath, chest pain or dyspnea on exertion.  Tolerating diet.    OBJECTIVE:     VITALS:  T(F): 97.6, Max: 97.6 (12-17 @ 22:49)  HR: 62 (60 - 66)  BP: 126/62 (121/62 - 132/59)  RR: 18 (18 - 18)  SpO2: 98% (98% - 99%)  Wt(kg): --    I&O's Summary      MEDICATIONS  (STANDING):  vancomycin    Solution 250milliGRAM(s) Oral every 6 hours  dronabinol 2.5milliGRAM(s) Oral two times a day  insulin lispro (HumaLOG) corrective regimen sliding scale  SubCutaneous three times a day before meals  dextrose 5%. 1000milliLiter(s) IV Continuous <Continuous>  dextrose 50% Injectable 12.5Gram(s) IV Push once  dextrose 50% Injectable 25Gram(s) IV Push once  dextrose 50% Injectable 25Gram(s) IV Push once  metoprolol succinate ER 50milliGRAM(s) Oral daily  metroNIDAZOLE  IVPB  IV Intermittent   dextrose 5%. 1000milliLiter(s) IV Continuous <Continuous>  metroNIDAZOLE  IVPB 500milliGRAM(s) IV Intermittent every 8 hours  enoxaparin Injectable 70milliGRAM(s) SubCutaneous daily  vancomycin  Retention Enema 500milliGRAM(s) Rectal four times a day  sodium chloride 0.45% 1000milliLiter(s) IV Continuous <Continuous>  tamsulosin 0.4milliGRAM(s) Oral at bedtime    MEDICATIONS  (PRN):  ondansetron Injectable 4milliGRAM(s) IV Push every 6 hours PRN Nausea and/or Vomiting  dextrose Gel 1Dose(s) Oral once PRN Blood Glucose LESS THAN 70 milliGRAM(s)/deciLiter  glucagon  Injectable 1milliGRAM(s) IntraMuscular once PRN Glucose <70 milliGRAM(s)/deciLiter  acetaminophen   Tablet. 650milliGRAM(s) Oral every 6 hours PRN headache/pain      Physical Exam:  Constitutional: NAD  Pulmonary: clear to auscultation bilaterally   Cardiovascular: Regular rate and rhythm   Abdomen: soft, non-tender, non-distended, normal bowel sounds.   Extremities: no lower extremity edema or calf tenderness, Samir's sign negative.        LABS:                        9.9    36.53 )-----------( 245      ( 18 Dec 2016 08:17 )             28.3     18 Dec 2016 08:15    142    |  113    |  34.0   ----------------------------<  101    3.8     |  15.0   |  1.47     Ca    7.5        18 Dec 2016 08:15  Mg     1.4       18 Dec 2016 08:15            RADIOLOGY & ADDITIONAL TESTS:

## 2016-12-19 NOTE — PROGRESS NOTE ADULT - SUBJECTIVE AND OBJECTIVE BOX
GYNECOLOGIC ONCOLOGY PROGRESS NOTE    POD#    PROBLEMS:    Urinary tract infection without hematuria, site unspecified  Sepsis, due to unspecified organism  Clostridium difficile colitis  Urinary retention  Pulmonary embolism, other  Uterine sarcoma      Pt seen and examined at bedside. Treated for C.diff colitis with IV flagyl IV and vancomycin PO +enema  Reports improvement in diarrhea, 3-4 bowel movements yesterday, less watery.   WBC trending down, Creatinine normalized  Man[place] placed over the weekend for urinary retention. Patient was also started on Flomax  UTI shows Enterobacter colacae, enterobacter fecalis and ESBL  UOP 2600cc  Getting therapeutic lovenox for recent PE      OBJECTIVE:     VITALS:  T(F): 97.7, Max: 98.7 (12-18 @ 21:53)  HR: 63 (63 - 88)  BP: 120/54 (120/54 - 139/66)  RR: 18 (18 - 20)  SpO2: 35% (35% - 98%)        MEDICATIONS  (STANDING):  vancomycin    Solution 250milliGRAM(s) Oral every 6 hours  dronabinol 2.5milliGRAM(s) Oral two times a day  insulin lispro (HumaLOG) corrective regimen sliding scale  SubCutaneous three times a day before meals  dextrose 5%. 1000milliLiter(s) IV Continuous <Continuous>  dextrose 50% Injectable 12.5Gram(s) IV Push once  dextrose 50% Injectable 25Gram(s) IV Push once  dextrose 50% Injectable 25Gram(s) IV Push once  metoprolol succinate ER 50milliGRAM(s) Oral daily  metroNIDAZOLE  IVPB  IV Intermittent   dextrose 5%. 1000milliLiter(s) IV Continuous <Continuous>  metroNIDAZOLE  IVPB 500milliGRAM(s) IV Intermittent every 8 hours  vancomycin  Retention Enema 500milliGRAM(s) Rectal four times a day  sodium chloride 0.45% 1000milliLiter(s) IV Continuous <Continuous>  tamsulosin 0.4milliGRAM(s) Oral at bedtime  enoxaparin Injectable 70milliGRAM(s) SubCutaneous every 12 hours  magnesium sulfate  IVPB 2Gram(s) IV Intermittent once    MEDICATIONS  (PRN):  ondansetron Injectable 4milliGRAM(s) IV Push every 6 hours PRN Nausea and/or Vomiting  dextrose Gel 1Dose(s) Oral once PRN Blood Glucose LESS THAN 70 milliGRAM(s)/deciLiter  glucagon  Injectable 1milliGRAM(s) IntraMuscular once PRN Glucose <70 milliGRAM(s)/deciLiter  acetaminophen   Tablet. 650milliGRAM(s) Oral every 6 hours PRN headache/pain      Physical Exam:  Constitutional: NAD  Pulmonary: clear to auscultation bilaterally   Cardiovascular: Regular rate and rhythm   Abdomen: soft, non-tender, non-distended, normal bowel sounds  Extremities: no lower extremity edema or calve tenderness, Samir's sign negative.        LABS:                        9.5    27.28 )-----------( 283      ( 19 Dec 2016 06:47 )             27.0     19 Dec 2016 06:47    142    |  113    |  20.0   ----------------------------<  107    3.4     |  18.0   |  0.69     Ca    7.4        19 Dec 2016 06:47  Mg     1.4       19 Dec 2016 06:47

## 2016-12-19 NOTE — PROGRESS NOTE ADULT - SUBJECTIVE AND OBJECTIVE BOX
HPI: Admitted with Colitis     Allergies    No Known Allergies    Intolerances      Uterine sarcoma  Cervical cancer  Uterine cancer  Dyslipidemia  Essential hypertension, hypertension with unspecified goal  Diabetes    MEDICATIONS  (STANDING):  vancomycin    Solution 250milliGRAM(s) Oral every 6 hours  dronabinol 2.5milliGRAM(s) Oral two times a day  insulin lispro (HumaLOG) corrective regimen sliding scale  SubCutaneous three times a day before meals  dextrose 5%. 1000milliLiter(s) IV Continuous <Continuous>  dextrose 50% Injectable 12.5Gram(s) IV Push once  dextrose 50% Injectable 25Gram(s) IV Push once  dextrose 50% Injectable 25Gram(s) IV Push once  metoprolol succinate ER 50milliGRAM(s) Oral daily  metroNIDAZOLE  IVPB  IV Intermittent   dextrose 5%. 1000milliLiter(s) IV Continuous <Continuous>  metroNIDAZOLE  IVPB 500milliGRAM(s) IV Intermittent every 8 hours  tamsulosin 0.4milliGRAM(s) Oral at bedtime  enoxaparin Injectable 70milliGRAM(s) SubCutaneous every 12 hours  sodium chloride 0.45% 1000milliLiter(s) IV Continuous <Continuous>    MEDICATIONS  (PRN):  ondansetron Injectable 4milliGRAM(s) IV Push every 6 hours PRN Nausea and/or Vomiting  dextrose Gel 1Dose(s) Oral once PRN Blood Glucose LESS THAN 70 milliGRAM(s)/deciLiter  glucagon  Injectable 1milliGRAM(s) IntraMuscular once PRN Glucose <70 milliGRAM(s)/deciLiter  acetaminophen   Tablet. 650milliGRAM(s) Oral every 6 hours PRN headache/pain                           9.5    27.28 )-----------( 283      ( 19 Dec 2016 06:47 )             27.0     19 Dec 2016 06:47    142    |  113    |  20.0   ----------------------------<  107    3.4     |  18.0   |  0.69     Ca    7.4        19 Dec 2016 06:47  Mg     1.4       19 Dec 2016 06:47        ;  Vital Signs Last 24 Hrs  T(C): 36.5, Max: 37.1 (12-18 @ 21:53)  T(F): 97.7, Max: 98.7 (12-18 @ 21:53)  HR: 63 (63 - 77)  BP: 120/54 (120/54 - 139/66)  BP(mean): --  RR: 18 (18 - 20)  SpO2: 95% (95% - 97%)  CAPILLARY BLOOD GLUCOSE  162 (19 Dec 2016 17:41)  121 (19 Dec 2016 12:12)  107 (19 Dec 2016 07:58)  144 (18 Dec 2016 21:53)    I & Os for 24h ending 12-19 @ 07:00  =============================================  IN: 3510 ml / OUT: 2600 ml / NET: 910 ml    I & Os for current day (as of 12-19 @ 20:39)  =============================================  IN: 3690 ml / OUT: 0 ml / NET: 3690 ml    Pt in bed feeling better Diarrhea less frequent No SOB, CP, minimal Abd pain Increase Po intake    HEENT: PEARLA  Neck: Supple  Cardio: S1 S2 No Murmur  Pulm: CTA No Rales or Ronchi  Abd: Soft NT mild distension BS+ No rebound  Rectal Pelvic Breast - refused  Ext: No DCT  Skin: No Rash  Neuro: Awake Pleasant    C diff colitis- po rectal vanco, IV flagyl  PE - lovenox  DM - SS  HTN- meds with parameters  Uterine Sarcoma - chemo 1 week ago  Renal Failure - improved

## 2016-12-19 NOTE — PROGRESS NOTE ADULT - SUBJECTIVE AND OBJECTIVE BOX
TRAN MCDONALD is a 58y Female with  with uterine SARCOMA   WITH C DIFF      Allergies:  No Known Allergies      Medications:  vancomycin    Solution 250milliGRAM(s) Oral every 6 hours  dronabinol 2.5milliGRAM(s) Oral two times a day  ondansetron Injectable 4milliGRAM(s) IV Push every 6 hours PRN  insulin lispro (HumaLOG) corrective regimen sliding scale  SubCutaneous three times a day before meals  dextrose 5%. 1000milliLiter(s) IV Continuous <Continuous>  dextrose Gel 1Dose(s) Oral once PRN  dextrose 50% Injectable 12.5Gram(s) IV Push once  dextrose 50% Injectable 25Gram(s) IV Push once  dextrose 50% Injectable 25Gram(s) IV Push once  glucagon  Injectable 1milliGRAM(s) IntraMuscular once PRN  metoprolol succinate ER 50milliGRAM(s) Oral daily  metroNIDAZOLE  IVPB  IV Intermittent   dextrose 5%. 1000milliLiter(s) IV Continuous <Continuous>  metroNIDAZOLE  IVPB 500milliGRAM(s) IV Intermittent every 8 hours  acetaminophen   Tablet. 650milliGRAM(s) Oral every 6 hours PRN  tamsulosin 0.4milliGRAM(s) Oral at bedtime  enoxaparin Injectable 70milliGRAM(s) SubCutaneous every 12 hours  sodium chloride 0.45% 1000milliLiter(s) IV Continuous <Continuous>      ANTIBIOTICS:         Review of Systems: - Negative except as mentioned above     Physical Exam:  ICU Vital Signs Last 24 Hrs  T(C): 36.5, Max: 37.1 (12-18 @ 21:53)  T(F): 97.7, Max: 98.7 (12-18 @ 21:53)  HR: 63 (63 - 77)  BP: 120/54 (120/54 - 139/66)  BP(mean): --  ABP: --  ABP(mean): --  RR: 18 (18 - 20)  SpO2: 95% (95% - 97%)    GEN: NAD, pleasant  HEENT: normocephalic and atraumatic. EOMI. IVÁN...  NECK: Supple. No carotid bruits.  No lymphadenopathy or thyromegaly.  LUNGS: Clear to auscultation.  HEART: Regular rate and rhythm without murmur.  ABDOMEN: Soft, nontender, and nondistended.  Positive bowel sounds.  No hepatosplenomegaly was noted.  NO REBOUND NO GUARDING  EXTREMITIES: Without any cyanosis, clubbing, rash, lesions or edema.  NEUROLOGIC: Cranial nerves II through XII are grossly intact.    SKIN: No ulceration or induration present.      Labs:  19 Dec 2016 06:47    142    |  113    |  20.0   ----------------------------<  107    3.4     |  18.0   |  0.69     Ca    7.4        19 Dec 2016 06:47  Mg     1.4       19 Dec 2016 06:47                            9.5    27.28 )-----------( 283      ( 19 Dec 2016 06:47 )             27.0                 CAPILLARY BLOOD GLUCOSE  162 (19 Dec 2016 17:41)  121 (19 Dec 2016 12:12)  107 (19 Dec 2016 07:58)  144 (18 Dec 2016 21:53)        RECENT CULTURES:  12-16 .Blood Blood XXXX XXXX   No growth at 48 hours    12-14 .Urine Catheterized Enterobacter cloacae  Escherichia coli (multi drug resistant)  Enterococcus faecalis XXXX   50,000 CFU/ml Enterobacter cloacae  50,000 CFU/ml Escherichia coli (multi drug resistant)  30,000 CFU/ml Enterococcus faecalis  .  TYPE: (C=Critical, N=Notification, A=Abnormal) c  TESTS:  _ E coli MDRO  DATE/TIME CALLED: _ 12/17/2016 10:49:09  CALL    12-13 .Stool Feces XXXX XXXX   No enteric gram negative rods isolated  No enteric pathogens isolated.  (Stool culture examined for Salmonella,  Shigella, Campylobacter, Aeromonas, Plesiomonas,  Vibrio, E.coli O157 and Yersinia)    12-13 .Blood Blood-Peripheral XXXX XXXX   No growth at 5 days.

## 2016-12-19 NOTE — PROGRESS NOTE ADULT - ASSESSMENT
PT IS 57yo FEMALE WITH UTERINE SARCOMA WITH C DIFF ON FLAGYL AND VANCO  URINE CX WITH LOW COLONY COUNT OF   THREE ORGANISMS WOULD REPEAT U/A URINE CX  WOULD TRY TO AVOID ABX IN THIS  PT WITH C DIFF UNLESS ABSOLUTELY  NECESSARY

## 2016-12-19 NOTE — PROGRESS NOTE ADULT - ASSESSMENT
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

## 2016-12-19 NOTE — PROGRESS NOTE ADULT - SUBJECTIVE AND OBJECTIVE BOX
Patient seen and evaluated  Reports 4 episodes of diarrhea today  Getting Vancomycin enema and PO vancomycin.  ID aware of UTI and recommend no treatment.   on exam:  Abdomen soft, not tender, BS +

## 2016-12-19 NOTE — PROGRESS NOTE ADULT - SUBJECTIVE AND OBJECTIVE BOX
NEPHROLOGY INTERVAL HPI/OVERNIGHT EVENTS:    Examine earlier  looks comfortable    MEDICATIONS  (STANDING):  vancomycin    Solution 250milliGRAM(s) Oral every 6 hours  dronabinol 2.5milliGRAM(s) Oral two times a day  insulin lispro (HumaLOG) corrective regimen sliding scale  SubCutaneous three times a day before meals  dextrose 5%. 1000milliLiter(s) IV Continuous <Continuous>  dextrose 50% Injectable 12.5Gram(s) IV Push once  dextrose 50% Injectable 25Gram(s) IV Push once  dextrose 50% Injectable 25Gram(s) IV Push once  metoprolol succinate ER 50milliGRAM(s) Oral daily  metroNIDAZOLE  IVPB  IV Intermittent   dextrose 5%. 1000milliLiter(s) IV Continuous <Continuous>  metroNIDAZOLE  IVPB 500milliGRAM(s) IV Intermittent every 8 hours  vancomycin  Retention Enema 500milliGRAM(s) Rectal four times a day  sodium chloride 0.45% 1000milliLiter(s) IV Continuous <Continuous>  tamsulosin 0.4milliGRAM(s) Oral at bedtime  enoxaparin Injectable 70milliGRAM(s) SubCutaneous every 12 hours    MEDICATIONS  (PRN):  ondansetron Injectable 4milliGRAM(s) IV Push every 6 hours PRN Nausea and/or Vomiting  dextrose Gel 1Dose(s) Oral once PRN Blood Glucose LESS THAN 70 milliGRAM(s)/deciLiter  glucagon  Injectable 1milliGRAM(s) IntraMuscular once PRN Glucose <70 milliGRAM(s)/deciLiter  acetaminophen   Tablet. 650milliGRAM(s) Oral every 6 hours PRN headache/pain      Allergies    No Known Allergies    Intolerances        Vital Signs Last 24 Hrs  T(C): 36.5, Max: 37.1 (12-18 @ 21:53)  T(F): 97.7, Max: 98.7 (12-18 @ 21:53)  HR: 63 (63 - 88)  BP: 120/54 (120/54 - 139/66)  BP(mean): --  RR: 18 (18 - 20)  SpO2: 95% (95% - 98%)  Daily     Daily Weight in k.5 (19 Dec 2016 11:14)    PHYSICAL EXAM:  GENERAL: NAD,   HEAD:  Atraumatic, Anicteric   NECK: Supple, No JVD,   CHEST/LUNG:EAE , no wheeze   HEART: Regular rate and rhythm; No rub   ABDOMEN: Soft, No rigidity or rebound   EXTREMITIES:  min edema                           9.5    27.28 )-----------( 283      ( 19 Dec 2016 06:47 )             27.0     19 Dec 2016 06:47    142    |  113    |  20.0   ----------------------------<  107    3.4     |  18.0   |  0.69     Ca    7.4        19 Dec 2016 06:47  Mg     1.4       19 Dec 2016 06:47          Magnesium, Serum: 1.4 mg/dL ( @ 06:47)          RADIOLOGY & ADDITIONAL TESTS:

## 2016-12-20 NOTE — PROGRESS NOTE ADULT - SUBJECTIVE AND OBJECTIVE BOX
HPI: Admitted with Colitis     Allergies    No Known Allergies    Intolerances      Uterine sarcoma  Cervical cancer  Uterine cancer  Dyslipidemia  Essential hypertension, hypertension with unspecified goal  Diabetes    MEDICATIONS  (STANDING):  vancomycin    Solution 250milliGRAM(s) Oral every 6 hours  insulin lispro (HumaLOG) corrective regimen sliding scale  SubCutaneous three times a day before meals  dextrose 5%. 1000milliLiter(s) IV Continuous <Continuous>  dextrose 50% Injectable 12.5Gram(s) IV Push once  dextrose 50% Injectable 25Gram(s) IV Push once  dextrose 50% Injectable 25Gram(s) IV Push once  metoprolol succinate ER 50milliGRAM(s) Oral daily  dextrose 5%. 1000milliLiter(s) IV Continuous <Continuous>  tamsulosin 0.4milliGRAM(s) Oral at bedtime  enoxaparin Injectable 70milliGRAM(s) SubCutaneous every 12 hours  sodium chloride 0.45% 1000milliLiter(s) IV Continuous <Continuous>    MEDICATIONS  (PRN):  ondansetron Injectable 4milliGRAM(s) IV Push every 6 hours PRN Nausea and/or Vomiting  dextrose Gel 1Dose(s) Oral once PRN Blood Glucose LESS THAN 70 milliGRAM(s)/deciLiter  glucagon  Injectable 1milliGRAM(s) IntraMuscular once PRN Glucose <70 milliGRAM(s)/deciLiter  acetaminophen   Tablet. 650milliGRAM(s) Oral every 6 hours PRN headache/pain                           9.4    21.80 )-----------( 289      ( 20 Dec 2016 06:33 )             26.8     20 Dec 2016 06:33    143    |  111    |  11.0   ----------------------------<  120    2.9     |  22.0   |  0.43     Ca    7.3        20 Dec 2016 06:33  Mg     1.5       20 Dec 2016 06:33        ;  Vital Signs Last 24 Hrs  T(C): 36.6, Max: 36.6 (12-20 @ 08:12)  T(F): 97.9, Max: 97.9 (12-20 @ 08:12)  HR: 68 (68 - 68)  BP: 134/60 (134/60 - 134/60)  BP(mean): --  RR: 18 (18 - 18)  SpO2: 94% (94% - 94%)  CAPILLARY BLOOD GLUCOSE  116 (20 Dec 2016 17:30)  134 (20 Dec 2016 11:55)  106 (20 Dec 2016 08:12)    I & Os for 24h ending 12-20 @ 07:00  =============================================  IN: 3690 ml / OUT: 2350 ml / NET: 1340 ml    I & Os for current day (as of 12-20 @ 22:45)  =============================================  IN: 3180 ml / OUT: 0 ml / NET: 3180 ml    Pt in bed feeling better Diarrhea less frequent No SOB, CP, minimal Abd pain Increase Po intake    HEENT: PEARLA  Neck: Supple  Cardio: S1 S2 No Murmur  Pulm: CTA No Rales or Ronchi  Abd: Soft NT mild distension BS+ No rebound  Rectal Pelvic Breast - refused  Ext: No DCT  Skin: No Rash  Neuro: Awake Pleasant    C diff colitis- po rectal vanco, IV flagyl  PE - lovenox  DM - SS  HTN- meds with parameters  Uterine Sarcoma - chemo 1 week ago

## 2016-12-20 NOTE — PROGRESS NOTE ADULT - SUBJECTIVE AND OBJECTIVE BOX
Patient seen  2 episodes of diarrhea today  significant improvement  Continues on vancomycin PO  Overall  no complaints.

## 2016-12-20 NOTE — PROGRESS NOTE ADULT - SUBJECTIVE AND OBJECTIVE BOX
NEPHROLOGY INTERVAL HPI/OVERNIGHT EVENTS:    Examiined earlier  Feeling better    MEDICATIONS  (STANDING):  vancomycin    Solution 250milliGRAM(s) Oral every 6 hours  dronabinol 2.5milliGRAM(s) Oral two times a day  insulin lispro (HumaLOG) corrective regimen sliding scale  SubCutaneous three times a day before meals  dextrose 5%. 1000milliLiter(s) IV Continuous <Continuous>  dextrose 50% Injectable 12.5Gram(s) IV Push once  dextrose 50% Injectable 25Gram(s) IV Push once  dextrose 50% Injectable 25Gram(s) IV Push once  metoprolol succinate ER 50milliGRAM(s) Oral daily  dextrose 5%. 1000milliLiter(s) IV Continuous <Continuous>  tamsulosin 0.4milliGRAM(s) Oral at bedtime  enoxaparin Injectable 70milliGRAM(s) SubCutaneous every 12 hours  sodium chloride 0.45% 1000milliLiter(s) IV Continuous <Continuous>    MEDICATIONS  (PRN):  ondansetron Injectable 4milliGRAM(s) IV Push every 6 hours PRN Nausea and/or Vomiting  dextrose Gel 1Dose(s) Oral once PRN Blood Glucose LESS THAN 70 milliGRAM(s)/deciLiter  glucagon  Injectable 1milliGRAM(s) IntraMuscular once PRN Glucose <70 milliGRAM(s)/deciLiter  acetaminophen   Tablet. 650milliGRAM(s) Oral every 6 hours PRN headache/pain      Allergies    No Known Allergies    Intolerances        Vital Signs Last 24 Hrs  T(C): 36.4, Max: 36.4 (12-19 @ 22:30)  T(F): 97.5, Max: 97.5 (12-19 @ 22:30)  HR: 65 (65 - 65)  BP: 150/71 (150/71 - 150/71)  BP(mean): --  RR: --  SpO2: --  Daily     Daily       PHYSICAL EXAM:  GENERAL: NAD,   HEAD:  Atraumatic, Anicteric   NECK: Supple, No JVD,   CHEST/LUNG:EAE , no wheeze   HEART: Regular rate and rhythm; No rub   ABDOMEN: Soft, No rigidity or rebound   EXTREMITIES:  min edema                             9.4    21.80 )-----------( 289      ( 20 Dec 2016 06:33 )             26.8     20 Dec 2016 06:33    143    |  111    |  11.0   ----------------------------<  120    2.9     |  22.0   |  0.43     Ca    7.3        20 Dec 2016 06:33  Mg     1.5       20 Dec 2016 06:33          Magnesium, Serum: 1.5 mg/dL (12-20 @ 06:33)          RADIOLOGY & ADDITIONAL TESTS:

## 2016-12-20 NOTE — PROGRESS NOTE ADULT - ASSESSMENT
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 Diff colitis with stool losses , Cr improved with IVF down to 0.43  Metastatic Uterine Sarcoma, has B/L [place] on CT with h/o retention folley now in place with good UOP  Continue IV Flagyl and enteral vanco  Gyn-Onc and urology follow up noted   Metabolic Acidosis 2/2 CADEN improved   Hypomagnesemia replaced  HypoKalemia ivf changed supplemented  cont IVF will change to: 1/2 NS w 75meq and 30meq kcl  at 80 cc/hr

## 2016-12-21 NOTE — CHART NOTE - NSCHARTNOTEFT_GEN_A_CORE
Called by nurse for abdominal pain.  Pt found to have feces and blood collected between her legs as well as noted hematuria in the saul catheter.  Saul not clogged with clot as it is flushing easily with saline. Pt complaining of abdominal pain, requesting tylenol and not narcotics. Pt not in extremis. Mentating well, denies chest pain, SOB.  OB/GYN fellow notified.

## 2016-12-21 NOTE — PROGRESS NOTE ADULT - SUBJECTIVE AND OBJECTIVE BOX
TRAN MCDONALD is a 58y Female with  with uterine SARCOMA   WITH C DIFF  WBC YP TO 40 TODAY  PT STTES DIARRHEA LESS  HB DROPPED AND RECEIVING TRANSFUSION    Allergies:  No Known Allergies      Medications:  vancomycin    Solution 250milliGRAM(s) Oral every 6 hours  dronabinol 2.5milliGRAM(s) Oral two times a day  ondansetron Injectable 4milliGRAM(s) IV Push every 6 hours PRN  insulin lispro (HumaLOG) corrective regimen sliding scale  SubCutaneous three times a day before meals  dextrose 5%. 1000milliLiter(s) IV Continuous <Continuous>  dextrose Gel 1Dose(s) Oral once PRN  dextrose 50% Injectable 12.5Gram(s) IV Push once  dextrose 50% Injectable 25Gram(s) IV Push once  dextrose 50% Injectable 25Gram(s) IV Push once  glucagon  Injectable 1milliGRAM(s) IntraMuscular once PRN  metoprolol succinate ER 50milliGRAM(s) Oral daily  metroNIDAZOLE  IVPB  IV Intermittent   dextrose 5%. 1000milliLiter(s) IV Continuous <Continuous>  metroNIDAZOLE  IVPB 500milliGRAM(s) IV Intermittent every 8 hours  acetaminophen   Tablet. 650milliGRAM(s) Oral every 6 hours PRN  tamsulosin 0.4milliGRAM(s) Oral at bedtime  enoxaparin Injectable 70milliGRAM(s) SubCutaneous every 12 hours  sodium chloride 0.45% 1000milliLiter(s) IV Continuous <Continuous>      ANTIBIOTICS: ORAL  VANCO        Review of Systems: - Negative except as mentioned above     Physical Exam:  ICU Vital Signs Last 24 Hrs  T(C): 36.5, Max: 37.1 (12-18 @ 21:53)  T(F): 97.7, Max: 98.7 (12-18 @ 21:53)  HR: 63 (63 - 77)  BP: 120/54 (120/54 - 139/66)  BP(mean): --  ABP: --  ABP(mean): --  RR: 18 (18 - 20)  SpO2: 95% (95% - 97%)    GEN: NAD, pleasant  HEENT: normocephalic and atraumatic. EOMI. IVÁN...  NECK: Supple. No carotid bruits.  No lymphadenopathy or thyromegaly.  LUNGS: Clear to auscultation.  HEART: Regular rate and rhythm without murmur.  ABDOMEN: Soft, nontender, and nondistended.  Positive bowel sounds.  No hepatosplenomegaly was noted.  NO REBOUND NO GUARDING  EXTREMITIES: Without any cyanosis, clubbing, rash, lesions or edema.  NEUROLOGIC: Cranial nerves II through XII are grossly intact.    SKIN: No ulceration or induration present.      Labs:  19 Dec 2016 06:47    142    |  113    |  20.0   ----------------------------<  107    3.4     |  18.0   |  0.69     Ca    7.4        19 Dec 2016 06:47  Mg     1.4       19 Dec 2016 06:47                            9.5    27.28 )-----------( 283      ( 19 Dec 2016 06:47 )             27.0                 CAPILLARY BLOOD GLUCOSE  162 (19 Dec 2016 17:41)  121 (19 Dec 2016 12:12)  107 (19 Dec 2016 07:58)  144 (18 Dec 2016 21:53)        RECENT CULTURES:  12-16 .Blood Blood XXXX XXXX   No growth at 48 hours    12-14 .Urine Catheterized Enterobacter cloacae  Escherichia coli (multi drug resistant)  Enterococcus faecalis XXXX   50,000 CFU/ml Enterobacter cloacae  50,000 CFU/ml Escherichia coli (multi drug resistant)  30,000 CFU/ml Enterococcus faecalis  .  TYPE: (C=Critical, N=Notification, A=Abnormal) c  TESTS:  _ E coli MDRO  DATE/TIME CALLED: _ 12/17/2016 10:49:09  CALL    12-13 .Stool Feces XXXX XXXX   No enteric gram negative rods isolated  No enteric pathogens isolated.  (Stool culture examined for Salmonella,  Shigella, Campylobacter, Aeromonas, Plesiomonas,  Vibrio, E.coli O157 and Yersinia)    12-13 .Blood Blood-Peripheral XXXX XXXX   No growth at 5 days.

## 2016-12-21 NOTE — PROCEDURE NOTE - NSSITEPREP_SKIN_A_CORE
Adherence to aseptic technique: hand hygiene prior to donning barriers (gown, gloves), don cap and mask, sterile drape over patient/chlorhexidine

## 2016-12-21 NOTE — PROGRESS NOTE ADULT - ASSESSMENT
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 Diff colitis with stool losses , Cr improved with IVF down to 0.43  Metastatic Uterine Sarcoma, has B/L [place] on CT with h/o retention folley now in place with good UOP  Continue IV Flagyl and enteral vanco  Gyn-Onc and urology follow up noted   Metabolic Acidosis 2/2 CADEN improved   Hypomagnesemia replaced  Hypocalcemia will give 1 gm Ca Gluc IV  HypoKalemia ivf changed supplemented  cont IVF : 1/2 NS w 75meq and 30meq kcl  at 80 cc/hr

## 2016-12-21 NOTE — PROGRESS NOTE ADULT - ASSESSMENT
PT IS 57yo FEMALE WITH UTERINE SARCOMA WITH C DIFF ON FLAGYL AND VANCO  URINE CX WITH LOW COLONY COUNT OF   THREE ORGANISMS WOULD REPEAT U/A URINE CX  WOULD TRY TO AVOID ABX IN THIS  PT WITH C DIFF UNLESS ABSOLUTELY  NECESSARY  LEUKOCYTOSIS MAY BE MULTIFACTORIAL WOULD REPEAT AFTER TRANSFUSION AND AWAIT URINE CX

## 2016-12-21 NOTE — PROGRESS NOTE ADULT - SUBJECTIVE AND OBJECTIVE BOX
GYNECOLOGIC ONCOLOGY PROGRESS NOTE      PROBLEMS:    Urinary tract infection with hematuria, site unspecified  Clostridium difficile colitis  Urinary retention  Pulmonary embolism, other  Uterine sarcoma    Pt seen and examined at bedside. Treated for clostridium difficile colitis, last episode of diarrhea early afternoon yesterday.   Patient reports no diarrhea since then, however started having gross hematuria in Manning catheter.   Patient had the catheter replaced due to urinary retention.   Pending Urology consult.   Hb 7.8 this am  Patient on lovenox for recent Hx of PE    On exam:  Constitutional: NAD  Pulmonary: clear to auscultation bilaterally   Cardiovascular: Regular rate and rhythm   Abdomen: soft, non-tender, non-distended, normal bowel sounds  Extremities: no lower extremity edema or calve tenderness, Samir's sign negative.  Manning - gross hematuria      OBJECTIVE:     VITALS:  T(F): 99.2, Max: 99.2 (12-21 @ 08:24)  HR: 94 (94 - 94)  BP: 120/67 (120/67 - 120/67)  RR: 18 (18 - 18)      MEDICATIONS  (STANDING):  vancomycin    Solution 250milliGRAM(s) Oral every 6 hours  insulin lispro (HumaLOG) corrective regimen sliding scale  SubCutaneous three times a day before meals  dextrose 5%. 1000milliLiter(s) IV Continuous <Continuous>  dextrose 50% Injectable 12.5Gram(s) IV Push once  dextrose 50% Injectable 25Gram(s) IV Push once  dextrose 50% Injectable 25Gram(s) IV Push once  metoprolol succinate ER 50milliGRAM(s) Oral daily  dextrose 5%. 1000milliLiter(s) IV Continuous <Continuous>  tamsulosin 0.4milliGRAM(s) Oral at bedtime  enoxaparin Injectable 70milliGRAM(s) SubCutaneous every 12 hours  sodium chloride 0.45% 1000milliLiter(s) IV Continuous <Continuous>  acetaminophen   Tablet 325milliGRAM(s) Oral two times a day      LABS:                        7.8    41.35 )-----------( 299      ( 21 Dec 2016 06:31 )             22.5     21 Dec 2016 06:29    143    |  107    |  11.0   ----------------------------<  194    3.4     |  24.0   |  0.81     Ca    6.7        21 Dec 2016 06:29  Mg     1.5       20 Dec 2016 06:33

## 2016-12-21 NOTE — PROGRESS NOTE ADULT - SUBJECTIVE AND OBJECTIVE BOX
NEPHROLOGY INTERVAL HPI/OVERNIGHT EVENTS:    Examined earlier  improved diarrhea    MEDICATIONS  (STANDING):  vancomycin    Solution 250milliGRAM(s) Oral every 6 hours  insulin lispro (HumaLOG) corrective regimen sliding scale  SubCutaneous three times a day before meals  dextrose 5%. 1000milliLiter(s) IV Continuous <Continuous>  dextrose 50% Injectable 12.5Gram(s) IV Push once  dextrose 50% Injectable 25Gram(s) IV Push once  dextrose 50% Injectable 25Gram(s) IV Push once  metoprolol succinate ER 50milliGRAM(s) Oral daily  dextrose 5%. 1000milliLiter(s) IV Continuous <Continuous>  tamsulosin 0.4milliGRAM(s) Oral at bedtime  enoxaparin Injectable 70milliGRAM(s) SubCutaneous every 12 hours  acetaminophen   Tablet 325milliGRAM(s) Oral two times a day    MEDICATIONS  (PRN):  dextrose Gel 1Dose(s) Oral once PRN Blood Glucose LESS THAN 70 milliGRAM(s)/deciLiter  glucagon  Injectable 1milliGRAM(s) IntraMuscular once PRN Glucose <70 milliGRAM(s)/deciLiter  acetaminophen   Tablet. 650milliGRAM(s) Oral every 6 hours PRN headache/pain      Allergies    No Known Allergies    Intolerances        Vital Signs Last 24 Hrs  T(C): 37.7, Max: 37.7 (- @ 15:07)  T(F): 99.9, Max: 99.9 (-21 @ 15:07)  HR: 73 (73 - 94)  BP: 94/70 (94/70 - 120/67)  BP(mean): --  RR: 18 (18 - 18)  SpO2: --  Daily     Daily     PHYSICAL EXAM:  GENERAL: NAD,   HEAD:  Atraumatic, Anicteric   NECK: Supple, No JVD,   CHEST/LUNG:EAE , no wheeze   HEART: Regular rate and rhythm; No rub   ABDOMEN: Soft, No rigidity or rebound   EXTREMITIES:  min edema                             7.8    41.35 )-----------( 299      ( 21 Dec 2016 06:31 )             22.5     21 Dec 2016 06:29    143    |  107    |  11.0   ----------------------------<  194    3.4     |  24.0   |  0.81     Ca    6.7        21 Dec 2016 06:29  Mg     1.2       21 Dec 2016 06:29    TPro  x      /  Alb  1.9    /  TBili  x      /  DBili  x      /  AST  x      /  ALT  x      /  AlkPhos  x      21 Dec 2016 06:29      Urinalysis Basic - ( 21 Dec 2016 14:08 )    Color: Red / Appearance: Bloody / S.020 / pH: x  Gluc: x / Ketone: Negative  / Bili: Negative / Urobili: Negative   Blood: x / Protein: 500 mg/dL / Nitrite: Negative   Leuk Esterase: Negative / RBC: TNTC /HPF / WBC 6-10 /HPF   Sq Epi: x / Non Sq Epi: Occasional / Bacteria: x      Magnesium, Serum: 1.2 mg/dL ( @ 06:29)          RADIOLOGY & ADDITIONAL TESTS:

## 2016-12-21 NOTE — CONSULT NOTE ADULT - SUBJECTIVE AND OBJECTIVE BOX
This is a 58F with dx of uterine sarcoma, admitted currently with urinary tract infection (MDR E.coli). Patient has now had three episodes of urinary retention and has been seen outpatient for management of saul.  Each time saul is removed, patient will void for a period of time before retaining again.  The latest episode during this admission, saul placed over the weekend producing adequate amount of clear urine.  Over night 12/20-12/21 patient began to produce penny blood in the urine.  She has had a drop in H/H requiring transfusion.  At this time she does not appear to be obstructed and there are no visible clots in the urine. Patient denies urgency or bladder pressure .      Allergies    No Known Allergies    Intolerances    MEDICATIONS  (STANDING):  vancomycin    Solution 250milliGRAM(s) Oral every 6 hours  insulin lispro (HumaLOG) corrective regimen sliding scale  SubCutaneous three times a day before meals  dextrose 5%. 1000milliLiter(s) IV Continuous <Continuous>  dextrose 50% Injectable 12.5Gram(s) IV Push once  dextrose 50% Injectable 25Gram(s) IV Push once  dextrose 50% Injectable 25Gram(s) IV Push once  metoprolol succinate ER 50milliGRAM(s) Oral daily  dextrose 5%. 1000milliLiter(s) IV Continuous <Continuous>  tamsulosin 0.4milliGRAM(s) Oral at bedtime  enoxaparin Injectable 70milliGRAM(s) SubCutaneous every 12 hours  sodium chloride 0.45% 1000milliLiter(s) IV Continuous <Continuous>  acetaminophen   Tablet 325milliGRAM(s) Oral two times a day    Vital Signs Last 24 Hrs  T(C): 37.3, Max: 37.3 (12-21 @ 08:24)  T(F): 99.2, Max: 99.2 (12-21 @ 08:24)  HR: 94 (94 - 94)  BP: 120/67 (120/67 - 120/67)  BP(mean): --  RR: 18 (18 - 18)  SpO2: --                          7.8    41.35 )-----------( 299      ( 21 Dec 2016 06:31 )             22.5   21 Dec 2016 06:29    143    |  107    |  11.0   ----------------------------<  194    3.4     |  24.0   |  0.81     Ca    6.7        21 Dec 2016 06:29  Mg     1.2       21 Dec 2016 06:29    TPro  x      /  Alb  1.9    /  TBili  x      /  DBili  x      /  AST  x      /  ALT  x      /  AlkPhos  x      21 Dec 2016 06:29    Physical exam:    General: Drowsy but arousable. Somewhat confused. 	    Genitourinary: NTTP suprapubic region. Urine with penny blood, no clots.

## 2016-12-21 NOTE — PROGRESS NOTE ADULT - SUBJECTIVE AND OBJECTIVE BOX
HPI: Admitted with colitis     Allergies    No Known Allergies    Intolerances      Uterine sarcoma  Cervical cancer  Uterine cancer  Dyslipidemia  Essential hypertension, hypertension with unspecified goal  Diabetes    MEDICATIONS  (STANDING):  vancomycin    Solution 250milliGRAM(s) Oral every 6 hours  insulin lispro (HumaLOG) corrective regimen sliding scale  SubCutaneous three times a day before meals  dextrose 5%. 1000milliLiter(s) IV Continuous <Continuous>  dextrose 50% Injectable 12.5Gram(s) IV Push once  dextrose 50% Injectable 25Gram(s) IV Push once  dextrose 50% Injectable 25Gram(s) IV Push once  metoprolol succinate ER 50milliGRAM(s) Oral daily  dextrose 5%. 1000milliLiter(s) IV Continuous <Continuous>  tamsulosin 0.4milliGRAM(s) Oral at bedtime  enoxaparin Injectable 70milliGRAM(s) SubCutaneous every 12 hours  acetaminophen   Tablet 325milliGRAM(s) Oral two times a day  nystatin    Suspension [id number](s) Swish and Swallow three times a day    MEDICATIONS  (PRN):  dextrose Gel 1Dose(s) Oral once PRN Blood Glucose LESS THAN 70 milliGRAM(s)/deciLiter  glucagon  Injectable 1milliGRAM(s) IntraMuscular once PRN Glucose <70 milliGRAM(s)/deciLiter  acetaminophen   Tablet. 650milliGRAM(s) Oral every 6 hours PRN headache/pain                           7.8    41.35 )-----------( 299      ( 21 Dec 2016 06:31 )             22.5     21 Dec 2016 06:29    143    |  107    |  11.0   ----------------------------<  194    3.4     |  24.0   |  0.81     Ca    6.7        21 Dec 2016 06:29  Mg     1.2       21 Dec 2016 06:29    TPro  x      /  Alb  1.9    /  TBili  x      /  DBili  x      /  AST  x      /  ALT  x      /  AlkPhos  x      21 Dec 2016 06:29      Urinalysis Basic - ( 21 Dec 2016 14:08 )    Color: Red / Appearance: Bloody / S.020 / pH: x  Gluc: x / Ketone: Negative  / Bili: Negative / Urobili: Negative   Blood: x / Protein: 500 mg/dL / Nitrite: Negative   Leuk Esterase: Negative / RBC: TNTC /HPF / WBC 6-10 /HPF   Sq Epi: x / Non Sq Epi: Occasional / Bacteria: x    ;  Vital Signs Last 24 Hrs  T(C): 37.7, Max: 37.7 ( @ 15:07)  T(F): 99.9, Max: 99.9 ( @ 15:07)  HR: 73 (73 - 94)  BP: 94/70 (94/70 - 120/67)  BP(mean): --  RR: 18 (18 - 18)  SpO2: --  CAPILLARY BLOOD GLUCOSE  137 (21 Dec 2016 17:04)  188 (21 Dec 2016 08:38)    I & Os for 24h ending  @ 07:00  =============================================  IN: 3180 ml / OUT: 780 ml / NET: 2400 ml    I & Os for current day (as of  @ 19:39)  =============================================  IN: 0 ml / OUT: 725 ml / NET: -725 ml    Pt in bed feeling better Diarrhea less frequent No SOB, CP, minimal Abd pain Increase Po intake Large vaginal and bladder hematuria last night    HEENT: PEARLA No thrush visible  Neck: Supple  Cardio: S1 S2 No Murmur  Pulm: CTA No Rales or Ronchi  Abd: Soft NT mild distension BS+ No rebound  Rectal Pelvic Breast - refused  Ext: No DCT  Skin: No Rash  Neuro: Awake Pleasant    C diff colitis- po rectal vanco, IV flagyl  PE - lovenox  DM - SS  HTN- meds with parameters  Uterine Sarcoma - chemo 1 week ago  Hematuria - Urology called PRBC given  Anemia - transfusion parameters  UTI -Multidrug resistant  Thrush - Patient tastes it Nystatin ordered

## 2016-12-21 NOTE — PROGRESS NOTE ADULT - SUBJECTIVE AND OBJECTIVE BOX
Patient seen and evaluated  Receiving blood transfusion now  Patient has gross blood in the saul catheter  Saul appears clotted.   Patient Denies any diarrhea from yesterday.   Planned to be evaluated by Dr. Paez today

## 2016-12-22 NOTE — PROCEDURE NOTE - NSPROCDETAILS_GEN_ALL_CORE
sterile technique, indwelling urinary device inserted/sterile technique, old cysto removed, new tube inserted/a urinary catheter insertion kit was used for all insertion materials
location identified, draped/prepped, sterile technique used/flushes easily/secured in place/sterile technique, catheter placed/ultrasound utilization/blood seen on insertion/dressing applied

## 2016-12-22 NOTE — PROGRESS NOTE ADULT - SUBJECTIVE AND OBJECTIVE BOX
NEPHROLOGY INTERVAL HPI/OVERNIGHT EVENTS:    Feels better   Less loose stool   IVF noted     MEDICATIONS  (STANDING):  vancomycin    Solution 250milliGRAM(s) Oral every 6 hours  insulin lispro (HumaLOG) corrective regimen sliding scale  SubCutaneous three times a day before meals  dextrose 5%. 1000milliLiter(s) IV Continuous <Continuous>  dextrose 50% Injectable 12.5Gram(s) IV Push once  dextrose 50% Injectable 25Gram(s) IV Push once  dextrose 50% Injectable 25Gram(s) IV Push once  metoprolol succinate ER 50milliGRAM(s) Oral daily  dextrose 5%. 1000milliLiter(s) IV Continuous <Continuous>  tamsulosin 0.4milliGRAM(s) Oral at bedtime  enoxaparin Injectable 70milliGRAM(s) SubCutaneous every 12 hours  nystatin    Suspension [id number](s) Swish and Swallow three times a day    MEDICATIONS  (PRN):  dextrose Gel 1Dose(s) Oral once PRN Blood Glucose LESS THAN 70 milliGRAM(s)/deciLiter  glucagon  Injectable 1milliGRAM(s) IntraMuscular once PRN Glucose <70 milliGRAM(s)/deciLiter  acetaminophen   Tablet. 650milliGRAM(s) Oral every 6 hours PRN headache/pain      Allergies    No Known Allergies    Intolerances          Vital Signs Last 24 Hrs  T(C): 36.6, Max: 37.7 (12-21 @ 18:05)  T(F): 97.8, Max: 99.8 (12-21 @ 18:05)  HR: 69 (69 - 89)  BP: 111/69 (101/76 - 136/61)  BP(mean): --  RR: 19 (18 - 20)  SpO2: --  Daily     Daily   I&O's Detail    I&O's Summary      PHYSICAL EXAM:  NEPHROLOGY INTERVAL HPI/OVERNIGHT EVENTS:    MEDICATIONS  (STANDING):  vancomycin    Solution 250milliGRAM(s) Oral every 6 hours  insulin lispro (HumaLOG) corrective regimen sliding scale  SubCutaneous three times a day before meals  dextrose 5%. 1000milliLiter(s) IV Continuous <Continuous>  dextrose 50% Injectable 12.5Gram(s) IV Push once  dextrose 50% Injectable 25Gram(s) IV Push once  dextrose 50% Injectable 25Gram(s) IV Push once  metoprolol succinate ER 50milliGRAM(s) Oral daily  dextrose 5%. 1000milliLiter(s) IV Continuous <Continuous>  tamsulosin 0.4milliGRAM(s) Oral at bedtime  enoxaparin Injectable 70milliGRAM(s) SubCutaneous every 12 hours  nystatin    Suspension [id number](s) Swish and Swallow three times a day    MEDICATIONS  (PRN):  dextrose Gel 1Dose(s) Oral once PRN Blood Glucose LESS THAN 70 milliGRAM(s)/deciLiter  glucagon  Injectable 1milliGRAM(s) IntraMuscular once PRN Glucose <70 milliGRAM(s)/deciLiter  acetaminophen   Tablet. 650milliGRAM(s) Oral every 6 hours PRN headache/pain      Allergies    No Known Allergies    Intolerances          REVIEW OF SYSTEMS:    CONSTITUTIONAL: No fever, weight loss, or fatigue  EYES: No eye pain, visual disturbances, or discharge  ENMT:  No difficulty hearing, tinnitus, vertigo; No sinus or throat pain  NECK: No pain or stiffness  BREASTS: No pain, masses, or nipple discharge  RESPIRATORY: No cough, wheezing, chills or hemoptysis; No shortness of breath  CARDIOVASCULAR: No chest pain, palpitations, dizziness, or leg swelling  GASTROINTESTINAL: No abdominal or epigastric pain. No nausea, vomiting, or hematemesis; No diarrhea or constipation. No melena or hematochezia.  GENITOURINARY: No dysuria, frequency, hematuria, or incontinence  NEUROLOGICAL: No headaches, memory loss, loss of strength, numbness, or tremors  SKIN: No itching, burning, rashes, or lesions   LYMPH NODES: No enlarged glands  ENDOCRINE: No heat or cold intolerance; No hair loss  MUSCULOSKELETAL: No joint pain or swelling; No muscle, back, or extremity pain  PSYCHIATRIC: No depression, anxiety, mood swings, or difficulty sleeping  HEME/LYMPH: No easy bruising, or bleeding gums  ALLERY AND IMMUNOLOGIC: No hives or eczema      Vital Signs Last 24 Hrs  T(C): 36.6, Max: 37.7 (12-21 @ 18:05)  T(F): 97.8, Max: 99.8 (12-21 @ 18:05)  HR: 69 (69 - 89)  BP: 111/69 (101/76 - 136/61)  BP(mean): --  RR: 19 (18 - 20)  SpO2: --  Daily     Daily   I&O's Detail    I&O's Summary      PHYSICAL EXAM:    GENERAL: NAD, well-groomed, well-developed  HEAD:  Atraumatic, Normocephalic  EYES: EOMI, PERRLA, conjunctiva and sclera clear  ENMT: No tonsillar erythema, exudates, or enlargement; Moist mucous membranes, Good dentition, No lesions  NECK: Supple, No JVD, Normal thyroid  NERVOUS SYSTEM:  Alert & Oriented X3, Good concentration; Motor Strength 5/5 B/L upper and lower extremities; DTRs 2+ intact and symmetric  CHEST/LUNG: Clear to percussion bilaterally; No rales, rhonchi, wheezing, or rubs  HEART: Regular rate and rhythm; No murmurs, rubs, or gallops  ABDOMEN: Soft, Nontender, Nondistended; Bowel sounds present  EXTREMITIES:  2+ Peripheral Pulses, No clubbing, cyanosis, or edema  LYMPH: No lymphadenopathy noted  SKIN: No rashes or lesions    LABS:                        9.1    61.18 )-----------( 236      ( 22 Dec 2016 11:40 )             26.0     22 Dec 2016 11:40    138    |  104    |  17.0   ----------------------------<  159    4.3     |  21.0   |  1.82     Ca    7.2        22 Dec 2016 11:40  Phos  3.7       22 Dec 2016 11:40  Mg     2.0       22 Dec 2016 11:40    TPro  x      /  Alb  1.7    /  TBili  x      /  DBili  x      /  AST  x      /  ALT  x      /  AlkPhos  x      22 Dec 2016 11:40    PT/INR - ( 22 Dec 2016 00:47 )   PT: 15.3 sec;   INR: 1.39 ratio         PTT - ( 22 Dec 2016 00:47 )  PTT:43.7 sec  Urinalysis Basic - ( 21 Dec 2016 14:08 )    Color: Red / Appearance: Bloody / S.020 / pH: x  Gluc: x / Ketone: Negative  / Bili: Negative / Urobili: Negative   Blood: x / Protein: 500 mg/dL / Nitrite: Negative   Leuk Esterase: Negative / RBC: TNTC /HPF / WBC 6-10 /HPF   Sq Epi: x / Non Sq Epi: Occasional / Bacteria: x      Magnesium, Serum: 2.0 mg/dL ( @ 11:40)  Phosphorus Level, Serum: 3.7 mg/dL ( @ 11:40)          RADIOLOGY & ADDITIONAL TESTS:    GENERAL: NAD,   HEAD:  Atraumatic, Anicteric   NECK: Supple, No JVD,   CHEST/LUNG:EAE , no wheeze   HEART: Regular rate and rhythm; No rub   ABDOMEN: Soft, No rigidity or rebound   EXTREMITIES:  min edema   LABS:                        9.1    61.18 )-----------( 236      ( 22 Dec 2016 11:40 )             26.0     22 Dec 2016 11:40    138    |  104    |  17.0   ----------------------------<  159    4.3     |  21.0   |  1.82     Ca    7.2        22 Dec 2016 11:40  Phos  3.7       22 Dec 2016 11:40  Mg     2.0       22 Dec 2016 11:40    TPro  x      /  Alb  1.7    /  TBili  x      /  DBili  x      /  AST  x      /  ALT  x      /  AlkPhos  x      22 Dec 2016 11:40    PT/INR - ( 22 Dec 2016 00:47 )   PT: 15.3 sec;   INR: 1.39 ratio         PTT - ( 22 Dec 2016 00:47 )  PTT:43.7 sec  Urinalysis Basic - ( 21 Dec 2016 14:08 )    Color: Red / Appearance: Bloody / S.020 / pH: x  Gluc: x / Ketone: Negative  / Bili: Negative / Urobili: Negative   Blood: x / Protein: 500 mg/dL / Nitrite: Negative   Leuk Esterase: Negative / RBC: TNTC /HPF / WBC 6-10 /HPF   Sq Epi: x / Non Sq Epi: Occasional / Bacteria: x      Magnesium, Serum: 2.0 mg/dL ( @ 11:40)  Phosphorus Level, Serum: 3.7 mg/dL ( @ 11:40)          RADIOLOGY & ADDITIONAL TESTS:

## 2016-12-22 NOTE — DISCHARGE NOTE ADULT - MEDICATION SUMMARY - MEDICATIONS TO TAKE
I will START or STAY ON the medications listed below when I get home from the hospital:    acetaminophen 325 mg oral tablet  -- 2 tab(s) by mouth every 6 hours, As needed, Mild Pain (1 - 3)  -- Indication: For Per PMD    calcium (as carbonate) 500 mg oral tablet  -- 2 tab(s) by mouth 2 times a day  -- Indication: For Per PMD    rivaroxaban 20 mg oral tablet  -- 1 tab(s) by mouth once a day  -- Indication: For Per PMD    HumaLOG 100 units/mL subcutaneous solution  --  subcutaneous as per sliding scale before meals  201-250 - 2 units  251-300 - 4 units  301-350 - 6 units  351-400 - 8 units  -- Indication: For Per PMD    Toujeo SoloStar 300 units/mL subcutaneous solution  -- 5 unit(s) subcutaneous once a day (at bedtime)  -- Indication: For Per PMD    Lomotil 2.5 mg-0.025 mg oral tablet  -- 2 tab(s) by mouth every 6 hours, As Needed - for diarrhea  -- Indication: For Per PMD    Toprol-XL 50 mg oral tablet, extended release  -- 1 tab(s) by mouth once a day  -- Indication: For Per PMD    amLODIPine 5 mg oral tablet  -- 1 tab(s) by mouth once a day  -- Indication: For Per PMD    Zaroxolyn  -- 2.5 milligram(s) by mouth once a day 30 min prior to Lasix  -- Indication: For Per PMD    Lasix 40 mg oral tablet  -- 0.5 tab(s) by mouth once a day  -- Indication: For Per PMD    Bentyl 10 mg oral capsule  -- 1 cap(s) by mouth 3 times a day, As Needed - for nausea  -- Indication: For Per PMD    potassium chloride 10 mEq oral tablet, extended release  -- 1 tab(s) by mouth once a day  -- Indication: For Per PMD    Acidophilus oral tablet  -- 1 tab(s) by mouth once a day  -- Indication: For Per PMD    Protonix 40 mg oral delayed release tablet  -- 1 tab(s) by mouth once a day  -- Indication: For Per PMD    multivitamin with minerals  -- 1 tab(s) by mouth once a day  -- Indication: For Per PMD

## 2016-12-22 NOTE — PROGRESS NOTE ADULT - SUBJECTIVE AND OBJECTIVE BOX
GYNECOLOGIC ONCOLOGY PROGRESS NOTE      PROBLEMS:    Hematuria, gross  Clostridium difficile colitis  UTI (urinary tract infection)  Urinary retention  Pulmonary embolism, other  Uterine sarcoma      Pt seen and examined at bedside. Patient has gross hematuria in saul  Patient seen by Dr Paez who recommended continuous bladder irrigation and possible cysto with fulguration and biopsy  Got 2XPRBC yesterday, Hb post transfusion 7.4, received 2 more units overnight, second unit is running   Patient on PO vancomycin for C.diff colitis.   Patient has Hx of PE, She is on theraputic lovenox  Will discuss with Dr. Paez to plan for OR.   White count increasing, will re-consult ID        OBJECTIVE:     VITALS:  T(F): 98.8, Max: 99.9 (12-21 @ 15:07)  HR: 73 (71 - 94)  BP: 123/60 (94/70 - 123/60)  RR: 20 (18 - 20)      MEDICATIONS  (STANDING):  vancomycin    Solution 250milliGRAM(s) Oral every 6 hours  insulin lispro (HumaLOG) corrective regimen sliding scale  SubCutaneous three times a day before meals  dextrose 5%. 1000milliLiter(s) IV Continuous <Continuous>  dextrose 50% Injectable 12.5Gram(s) IV Push once  dextrose 50% Injectable 25Gram(s) IV Push once  dextrose 50% Injectable 25Gram(s) IV Push once  metoprolol succinate ER 50milliGRAM(s) Oral daily  dextrose 5%. 1000milliLiter(s) IV Continuous <Continuous>  tamsulosin 0.4milliGRAM(s) Oral at bedtime  enoxaparin Injectable 70milliGRAM(s) SubCutaneous every 12 hours  nystatin    Suspension [id number](s) Swish and Swallow three times a day        Physical Exam:  Constitutional: NAD  Pulmonary: clear to auscultation bilaterally   Cardiovascular: Regular rate and rhythm   Abdomen: soft, non-tender, non-distended, normal bowel sounds  Extremities: no lower extremity edema or calve tenderness, Samir's sign negative.        LABS:                        7.4    57.43 )-----------( 262      ( 22 Dec 2016 00:47 )             20.9     21 Dec 2016 06:29    143    |  107    |  11.0   ----------------------------<  194    3.4     |  24.0   |  0.81     Ca    6.7        21 Dec 2016 06:29  Mg     1.2       21 Dec 2016 06:29    TPro  x      /  Alb  1.9    /  TBili  x      /  DBili  x      /  AST  x      /  ALT  x      /  AlkPhos  x      21 Dec 2016 06:29    PT/INR - ( 22 Dec 2016 00:47 )   PT: 15.3 sec;   INR: 1.39 ratio         PTT - ( 22 Dec 2016 00:47 )  PTT:43.7 sec  Urinalysis Basic - ( 21 Dec 2016 14:08 )    Color: Red / Appearance: Bloody / S.020 / pH: x  Gluc: x / Ketone: Negative  / Bili: Negative / Urobili: Negative   Blood: x / Protein: 500 mg/dL / Nitrite: Negative   Leuk Esterase: Negative / RBC: TNTC /HPF / WBC 6-10 /HPF   Sq Epi: x / Non Sq Epi: Occasional / Bacteria: x        RADIOLOGY & ADDITIONAL TESTS:

## 2016-12-22 NOTE — PROCEDURE NOTE - NSINFORMCONSENT_GEN_A_CORE
Benefits, risks, and possible complications of procedure explained to patient/caregiver who verbalized understanding and gave verbal consent.

## 2016-12-22 NOTE — PROGRESS NOTE ADULT - SUBJECTIVE AND OBJECTIVE BOX
Patient seen and evaluated  Feeling well, getting another PRBC now.   Continuous bladder irrigation in place.   Hb after morning transfusion 9.1  Continues treatment for C.Diff  ID following, recommend Vancomycin taper, and WBC follow up

## 2016-12-22 NOTE — PROGRESS NOTE ADULT - ASSESSMENT
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

## 2016-12-22 NOTE — PROCEDURE NOTE - NSURITECHNIQUE_GU_A_CORE
Sterile gloves were worn for the duration of the procedure/The collection bag is below the level of the patient and urinary bladder/The catheter was appropriately lubricated/A sterile drape was used to cover all adjacent areas/The site was cleaned with soap/water and sterile solution (betadine)/The catheter was secured with a securement device (e.g. StatLock)/The urinary drainage system is closed at the end of the procedure/All applicable medical record documentation is completed/Proper hand hygiene was performed

## 2016-12-22 NOTE — DISCHARGE NOTE ADULT - CARE PLAN
Principal Discharge DX:	Urinary tract infection without hematuria, site unspecified  Goal:	improve condition  Instructions for follow-up, activity and diet:	Please follow-up in office as needed  Activity as tolerated  Regular diet

## 2016-12-22 NOTE — DISCHARGE NOTE ADULT - HOSPITAL COURSE
59 yo female presented to the ER with c/o urinary retention. Patient was found to have leukocytosis and a UTI. Her saul was replaced and patient was seen by ID. She was started on IV antibiotics and followed. Patient was also found to be positive for c. dif infection during hospital stay for which she is currently being treated, isolation precautions were taken. Patient then presented with gross hematuria for which Urology was consulted. Dr. Paez replaced saul and patient was continuously irrigated/observed. 59 yo female presented to the ER with c/o urinary retention and weakness. Patient was found to have leukocytosis. a UTI and anemia. Patient was transfused as needed. Her saul was replaced and patient was seen by ID. She was started on IV antibiotics and followed. Patient was also found to be positive for c. dif infection during hospital stay for which she is currently being treated, isolation precautions were taken. Patient then presented with gross hematuria for which Urology was consulted. Dr. Paez replaced saul and patient was continuously irrigated/observed. Blood cultures were negative for any growth. 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 Patient is scheduled for her second cycle of chemotherapy upon discharge. 59 yo female presented to the ER with c/o urinary retention and weakness. Patient was found to have leukocytosis and anemia. Patient was transfused as needed. She was also found to a have a UTI, UCX grew enterococcus, e. coli and enterobacter. Her saul was replaced and patient was seen by ID. She was started on IV antibiotics and followed. Patient was also found to be positive for c. dif infection during hospital stay for which she is currently being treated, isolation precautions were taken. Patient then presented with gross hematuria for which Urology was consulted. Dr. Paez replaced saul and patient was continuously irrigated/observed. Blood cultures were negative for any growth. Repeat ucx revealed gram negative rods. 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 Patient is scheduled for her second cycle of chemotherapy upon discharge. 57 yo female presented to the ER with c/o urinary retention and weakness. Patient was found to have leukocytosis and anemia. Patient was transfused as needed. She was also found to a have a UTI, UCX grew enterococcus, e. coli and enterobacter. Her saul was replaced and patient was seen by ID. She was started on IV antibiotics and followed. Patient was also found to be positive for c. dif infection during hospital stay for which she is currently being treated, isolation precautions were taken. Patient then presented with gross hematuria for which Urology was consulted. Dr. Paez replaced saul and patient was continuously irrigated/observed. Original Blood cultures were negative for any growth, however repeat urine and blood cultures revealed gram negative rods. Patient was taken to OR by Dr. Paez for cystoscopy on 12.23.16.    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 Patient is scheduled for her second cycle of chemotherapy upon discharge. 57 yo female presented to the ER with c/o urinary retention and weakness. Patient was found to have leukocytosis and anemia. Patient was transfused as needed. She was also found to a have a UTI, UCX grew enterococcus, e. coli and enterobacter. Her saul was replaced and patient was seen by ID. She was started on IV antibiotics and followed. Patient was also found to be positive for c. dif infection during hospital stay for which she is currently being treated, isolation precautions were taken. Patient then presented with gross hematuria for which Urology was consulted. Dr. Paez replaced saul and patient was continuously irrigated/observed. Repeat urine and blood cultures revealed gram negative rods. Patient was taken to OR by Dr. Paez for cystoscopy on 12.23.16.    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 Patient is scheduled for her second cycle of chemotherapy upon discharge.

## 2016-12-22 NOTE — PROCEDURE NOTE - ADDITIONAL PROCEDURE DETAILS
#18 gauge midline inserted by ultrasound guidance to right cephalic vein.  Good heme return. Patient tolerated well.
[address] way [address] for Chronic bladder irrigation with gross hematuria

## 2016-12-22 NOTE — DISCHARGE NOTE ADULT - PATIENT PORTAL LINK FT
“You can access the [facility] Patient Portal, offered by [facility], by registering with the following website: [email or web address]”

## 2016-12-22 NOTE — CONSULT NOTE ADULT - SUBJECTIVE AND OBJECTIVE BOX
CARDIOLOGY CONSULTATION NOTE       History obtained by: Patient, family and medical record     obtained: No    Chief complaint:    Patient is a 58y old  Female who is being scheduled for possible cysto with fulguration and biopsy has abnormal EKG thus preoperative cardiovascular delamination is requested    HPI:  57 yo F with h/o uterine Sarcoma, s/p Chemotherapy and has gross hematuria, receiving PRBC transfusion and continuos bladder irrigation is being scheduled for possible cysto with fulguration and biopsy.    She has h/o HTN, DM, Dyslipidemia, s/p Bariatric Surgery with lap band, uterine cancer, cervical cancer, uterine sarcoma and had bacteremia in last september, complicated by PE and urinary retention,    She had outpatient cardiology RO[facility] with Dr. Yi's group approximately 2 years ago which included nuclear stress test and Echo which are reportedly normal as per patient. She never had h/o MI or CAD. She never had cardiac catheterization. She denied CP. She denied dyspnea, orthopnea PND, edema, palpitation, syncope, near syncope. She denied exertional symptoms.      REVIEW OF SYMPTOMS:   Constitutional Denied: fever, chills, admitted weight loss   Eyes: Denied: reddened ayes, eye discharge, eye pain  ENMT: Denied: ear pain, nasal discharge, mouth pain, throat pain or swelling  Cardiovascular: Denied: chest pain,  Chest pressure, palpitation, arrhythmia irregular or fast heart beats, edema  Respiratory: Denied: cough, phlegm production, wheezes, dyspnea, orthopnea, PND,   GI: Denied Abdominal pain  : Denied: admitted hematuria  Musculoskeletal Denied: Muscle aches  Heme/lymp: Admitted anemia, bleeding  Neuro: Denied: Headache,numbness, aphasia dysarthria, seizure,  syncope, near syncope  Psych: Denied: depression, anxiety  Integumentary/skin: Denied: rash, bruises, ecchymosis, itching  Allergy/Imm: denied environmental or drug allergies  ALL OTHER REVIEW OF SYSTEMS ARE NEGATIVE.    MEDICATIONS  (STANDING):  vancomycin    Solution 250milliGRAM(s) Oral every 6 hours  insulin lispro (HumaLOG) corrective regimen sliding scale  SubCutaneous three times a day before meals  dextrose 5%. 1000milliLiter(s) IV Continuous <Continuous>  dextrose 50% Injectable 12.5Gram(s) IV Push once  dextrose 50% Injectable 25Gram(s) IV Push once  dextrose 50% Injectable 25Gram(s) IV Push once  metoprolol succinate ER 50milliGRAM(s) Oral daily  dextrose 5%. 1000milliLiter(s) IV Continuous <Continuous>  tamsulosin 0.4milliGRAM(s) Oral at bedtime  enoxaparin Injectable 70milliGRAM(s) SubCutaneous every 12 hours  nystatin    Suspension [id number](s) Swish and Swallow three times a day    MEDICATIONS  (PRN):  dextrose Gel 1Dose(s) Oral once PRN Blood Glucose LESS THAN 70 milliGRAM(s)/deciLiter  glucagon  Injectable 1milliGRAM(s) IntraMuscular once PRN Glucose <70 milliGRAM(s)/deciLiter  acetaminophen   Tablet. 650milliGRAM(s) Oral every 6 hours PRN headache/pain    PAST MEDICAL & SURGICAL HISTORY:  Uterine sarcoma  Cervical cancer  Uterine cancer  Dyslipidemia  Essential hypertension, hypertension with unspecified goal  Diabetes  S/P bariatric surgery: lap band      FAMILY HISTORY:  Family history of coronary artery disease      SOCIAL HISTORY:    CIGARETTES:   no    ALCOHOL: no    DRUGS: no    Vital Signs Last 24 Hrs  T(C): 37.1, Max: 37.7 ( @ 15:07)  T(F): 98.8, Max: 99.9 ( @ 15:07)  HR: 73 (71 - 82)  BP: 123/60 (94/70 - 123/60)  BP(mean): --  RR: 20 (18 - 20)  SpO2: --    PHYSICAL EXAM:      Constitutional: no fever, chills    Eyes: not reddened    ENMT: eyes are not injected    Neck: No JVD/Bruit      Back: NO CVA tenderness    Respiratory: clear to auscultation bilaterally    Cardiovascular: Normal S1-2, No S3-4, No friction rub 1/6 SM at apex    Gastrointestinal: Soft, NT/ND, BS+, No organonegaly    Extremities: No shelby    Vascular: distal pulses intact    Neurological: alert, awake, riented    Skin: No rash    Psychiatric: no anxiety          INTERPRETATION OF TELEMETRY:      EKG: SR, 1st degree AV Block with  ms, PRWP    I&O's Detail      LABS:                        7.4    57.43 )-----------( 262      ( 22 Dec 2016 00:47 )             20.9     21 Dec 2016 06:29    143    |  107    |  11.0   ----------------------------<  194    3.4     |  24.0   |  0.81     Ca    6.7        21 Dec 2016 06:29  Mg     1.2       21 Dec 2016 06:29    TPro  x      /  Alb  1.9    /  TBili  x      /  DBili  x      /  AST  x      /  ALT  x      /  AlkPhos  x      21 Dec 2016 06:29        PT/INR - ( 22 Dec 2016 00:47 )   PT: 15.3 sec;   INR: 1.39 ratio         PTT - ( 22 Dec 2016 00:47 )  PTT:43.7 sec  Urinalysis Basic - ( 21 Dec 2016 14:08 )    Color: Red / Appearance: Bloody / S.020 / pH: x  Gluc: x / Ketone: Negative  / Bili: Negative / Urobili: Negative   Blood: x / Protein: 500 mg/dL / Nitrite: Negative   Leuk Esterase: Negative / RBC: TNTC /HPF / WBC 6-10 /HPF   Sq Epi: x / Non Sq Epi: Occasional / Bacteria: x      I&O's Summary      RADIOLOGY & ADDITIONAL STUDIES:  X-ray:    PREVIOUS DIAGNOSTIC TESTING:

## 2016-12-22 NOTE — PROCEDURE NOTE - NSINDICATIONS_GEN_A_CORE
other/strict intake/output during critical illness/urinry obstruction or retention/Gross Hematuria obstructing saul catheter

## 2016-12-22 NOTE — PROCEDURE NOTE - NSPOSTCAREGUIDE_GEN_A_CORE
Verbal/written post procedure instructions were given to patient/caregiver/Instructed patient/caregiver regarding signs and symptoms of infection/Instructed patient/caregiver to follow-up with primary care physician/Keep the cast/splint/dressing clean and dry/Care for catheter as per unit/ICU protocols
Verbal/written post procedure instructions were given to patient/caregiver

## 2016-12-22 NOTE — PROCEDURE NOTE - PROCEDURE
Vascular access with ultrasound guidance  12/21/2016  18G   10cm  Midline insertion with ultrasound  Active  [id number]
Urinary catheterization  12/22/2016    Active  DEBRA

## 2016-12-22 NOTE — DISCHARGE NOTE ADULT - CARE PROVIDER_API CALL
Agus Lewis), Gynecologic Oncology; Obstetrics and Gynecology  [facility] GYNOCOLOGIC ONCOLOGY [address] [place] [address]  Phone: [phone]  Fax: [phone]

## 2016-12-22 NOTE — PROGRESS NOTE ADULT - SUBJECTIVE AND OBJECTIVE BOX
Patient is a 58y old  Female who presents with a chief complaint of They did a urine culture at the rehab said I had a uti. (18 Dec 2016 01:48)  pt seen by urology yesterday and will likely be taken to the OR  Cardio called for abnormal EKG and optimizing for OR   ECHO ordered    INTERVAL HPI/OVERNIGHT EVENTS:  58y  Female    ANTIMICROBIAL:  vancomycin    Solution 250milliGRAM(s) Oral every 6 hours  nystatin    Suspension [id number]) Swish and Swallow three times a day    CARDIOVASCULAR:  metoprolol succinate ER 50milliGRAM(s) Oral daily  tamsulosin 0.4milliGRAM(s) Oral at bedtime    PULMONARY:    NEUROLOGIC:  acetaminophen   Tablet. 650milliGRAM(s) Oral every 6 hours PRN    ONCOLOGIC:    HEMATOLOGIC:  enoxaparin Injectable 70milliGRAM(s) SubCutaneous every 12 hours    GATROINTESTINAL:     MEDS:    ENDO/METABOLIC:  insulin lispro (HumaLOG) corrective regimen sliding scale  SubCutaneous three times a day before meals  dextrose Gel 1Dose(s) Oral once PRN  dextrose 50% Injectable 12.5Gram(s) IV Push once  dextrose 50% Injectable 25Gram(s) IV Push once  dextrose 50% Injectable 25Gram(s) IV Push once  glucagon  Injectable 1milliGRAM(s) IntraMuscular once PRN    IV FLUID/NUTRITION:  dextrose 5%. 1000milliLiter(s) IV Continuous <Continuous>  dextrose 5%. 1000milliLiter(s) IV Continuous <Continuous>    TOPICAL:    IMMUNOLOGIC & OTHER      Allergies    No Known Allergies    Intolerances        Vital Signs Last 24 Hrs  T(C): 37.1, Max: 37.7 (- @ 15:07)  T(F): 98.8, Max: 99.9 (-21 @ 15:07)  HR: 73 (71 - 82)  BP: 123/60 (94/70 - 123/60)  BP(mean): --  RR: 20 (18 - 20)  SpO2: --      Daily     Daily   I&O's Detail    Last Menstrual Period        REVIEW OF SYSTEMS:    CONSTITUTIONAL: No fever, weight loss, or fatigue  EYES: No eye pain, visual disturbances, or discharge  ENMT:  No difficulty hearing, tinnitus, vertigo; No sinus or throat pain  NECK: No pain or stiffness  BREASTS: No pain, masses, or nipple discharge  RESPIRATORY: No cough, wheezing, chills or hemoptysis; No shortness of breath  CARDIOVASCULAR: No chest pain, palpitations, dizziness, or leg swelling  GASTROINTESTINAL: No abdominal or epigastric pain. No nausea, vomiting, or hematemesis; No diarrhea or constipation. No melena or hematochezia.  GENITOURINARY: + hematuria  NEUROLOGICAL: No headaches, memory loss, loss of strength, numbness, or tremors  SKIN: No itching, burning, rashes, or lesions   LYMPH NODES: No enlarged glands  ENDOCRINE: No heat or cold intolerance; No hair loss  MUSCULOSKELETAL: No joint pain or swelling; No muscle, back, or extremity pain  PSYCHIATRIC: No depression, anxiety, mood swings, or difficulty sleeping  HEME/LYMPH: No easy bruising, or bleeding gums  ALLERY AND IMMUNOLOGIC: No hives or eczema      PHYSICAL EXAM:    GENERAL: NAD, well-groomed, well-developed  HEAD:  Atraumatic, Normocephalic  EYES: EOMI, PERRLA, conjunctiva and sclera clear  ENMT: No tonsillar erythema, exudates, or enlargement; Moist mucous membranes, Good dentition, No lesions  NECK: Supple, No JVD, Normal thyroid  NERVOUS SYSTEM:  Alert & Oriented X3, Good concentration; Motor Strength 5/5 B/L upper and lower extremities; DTRs 2+ intact and symmetric  CHEST/LUNG: Clear to percussion bilaterally; No rales, rhonchi, wheezing, or rubs  HEART: Regular rate and rhythm; No murmurs, rubs, or gallops  ABDOMEN: Soft, Nontender, Nondistended; Bowel sounds present  EXTREMITIES:  2+ Peripheral Pulses, No clubbing, cyanosis, or edema  LYMPH: No lymphadenopathy noted  + hematuria and saul       LABS:                        7.4    57.43 )-----------( 262      ( 22 Dec 2016 00:47 )             20.9     CBC Full  -  ( 22 Dec 2016 00:47 )  WBC Count : 57.43 K/uL  Hemoglobin : 7.4 g/dL  Hematocrit : 20.9 %  Platelet Count - Automated : 262 K/uL  Mean Cell Volume : 83.3 fl  Mean Cell Hemoglobin : 29.5 pg  Mean Cell Hemoglobin Concentration : 35.4 g/dL  Auto Neutrophil # : x  Auto Lymphocyte # : x  Auto Monocyte # : x  Auto Eosinophil # : x  Auto Basophil # : x  Auto Neutrophil % : x  Auto Lymphocyte % : x  Auto Monocyte % : x  Auto Eosinophil % : x  Auto Basophil % : x    21 Dec 2016 06:29    143    |  107    |  11.0   ----------------------------<  194    3.4     |  24.0   |  0.81     Ca    6.7        21 Dec 2016 06:29  Mg     1.2       21 Dec 2016 06:29    TPro  x      /  Alb  1.9    /  TBili  x      /  DBili  x      /  AST  x      /  ALT  x      /  AlkPhos  x      21 Dec 2016 06:29    PT/INR - ( 22 Dec 2016 00:47 )   PT: 15.3 sec;   INR: 1.39 ratio         PTT - ( 22 Dec 2016 00:47 )  PTT:43.7 sec  Urinalysis Basic - ( 21 Dec 2016 14:08 )    Color: Red / Appearance: Bloody / S.020 / pH: x  Gluc: x / Ketone: Negative  / Bili: Negative / Urobili: Negative   Blood: x / Protein: 500 mg/dL / Nitrite: Negative   Leuk Esterase: Negative / RBC: TNTC /HPF / WBC 6-10 /HPF   Sq Epi: x / Non Sq Epi: Occasional / Bacteria: x      Hemoglobin A1C, Whole Blood: 6.2 % ( @ 01:06)  Hemoglobin A1C, Whole Blood: 5.7 % (10-01 @ 05:55)    Culture Results:   No growth at 5 days. ( @ 06:59)  Culture Results:   No growth at 5 days. ( @ 06:59)  Culture Results:   50,000 CFU/ml Enterobacter cloacae  50,000 CFU/ml Escherichia coli (multi drug resistant)  30,000 CFU/ml Enterococcus faecalis  .  TYPE: (C=Critical, N=Notification, A=Abnormal) c  TESTS:  _ E coli MDRO  DATE/TIME CALLED: _ 2016 10:49:09  CALL ( @ 03:46)  Culture Results:   No enteric gram negative rods isolated  No enteric pathogens isolated.  (Stool culture examined for Salmonella,  Shigella, Campylobacter, Aeromonas, Plesiomonas,  Vibrio, E.coli O157 and Yersinia) ( @ 21:28)  Culture Results:   No growth at 5 days. ( @ 21:02)  Culture Results:   No growth at 5 days. ( @ 21:02)          LIVER FUNCTIONS - ( 21 Dec 2016 06:29 )  Alb: 1.9 g/dL / Pro: x     / ALK PHOS: x     / ALT: x     / AST: x     / GGT: x             RADIOLOGY & ADDITIONAL TESTS:  ECHO = pending  EKG= PRWP

## 2016-12-22 NOTE — CONSULT NOTE ADULT - ATTENDING COMMENTS
Assessment and Plan:     59 yo F with h/o HTN, DM, Dyslipidemia, s/p Bariatric Surgery with lap band, uterine cancer, cervical cancer, uterine sarcoma and had bacteremia in last september, complicated by PE and urinary retention, uterine Sarcoma, s/p Chemotherapy and has gross hematuria, receiving PRBC transfusion and continuos bladder irrigation is being scheduled for possible cysto with fulguration and biopsy. She had outpatient cardiology ROJO with Dr. Yi's group approximately 2 years ago which included nuclear stress test and Echo which are reportedly normal as per patient. She never had h/o MI or CAD. She never had cardiac catheterization. She denied CP. She denied dyspnea, orthopnea PND, edema, palpitation, syncope, near syncope. She denied exertional symptoms.    1) Preoperative Cardiovascular Examination  2) Anemia  3) Hematuria  4) HTN  5) h/o PE  6) DM  7) Uterine Sarcoma  8) Dyslipidemia    Will obtain TTE, if normal, patient would be cleared for the planned surgery from cardiac standpoint due to urgency of the surgery.  Continue telemetry monitoring is recommended.  Transfuse as needed  FU CBC

## 2016-12-22 NOTE — PROGRESS NOTE ADULT - SUBJECTIVE AND OBJECTIVE BOX
TRAN MCDONALD is a 58y Female with  with uterine SARCOMA   WITH C DIFF  WITH HEMATURIA S/P TRANSFUSION YESTERDAY X2 AND TODAY  WBC 50  TODAY  PT STATES DIARRHEA RESOLVED  FEELS BETTER    Allergies:  No Known Allergies      Medications:  vancomycin    Solution 250milliGRAM(s) Oral every 6 hours  dronabinol 2.5milliGRAM(s) Oral two times a day  ondansetron Injectable 4milliGRAM(s) IV Push every 6 hours PRN  insulin lispro (HumaLOG) corrective regimen sliding scale  SubCutaneous three times a day before meals  dextrose 5%. 1000milliLiter(s) IV Continuous <Continuous>  dextrose Gel 1Dose(s) Oral once PRN  dextrose 50% Injectable 12.5Gram(s) IV Push once  dextrose 50% Injectable 25Gram(s) IV Push once  dextrose 50% Injectable 25Gram(s) IV Push once  glucagon  Injectable 1milliGRAM(s) IntraMuscular once PRN  metoprolol succinate ER 50milliGRAM(s) Oral daily  metroNIDAZOLE  IVPB  IV Intermittent   dextrose 5%. 1000milliLiter(s) IV Continuous <Continuous>  metroNIDAZOLE  IVPB 500milliGRAM(s) IV Intermittent every 8 hours  acetaminophen   Tablet. 650milliGRAM(s) Oral every 6 hours PRN  tamsulosin 0.4milliGRAM(s) Oral at bedtime  enoxaparin Injectable 70milliGRAM(s) SubCutaneous every 12 hours  sodium chloride 0.45% 1000milliLiter(s) IV Continuous <Continuous>      ANTIBIOTICS: ORAL  VANCO        Review of Systems: - Negative except as mentioned above     Physical Exam:  ICU Vital Signs Last 24 Hrs  T(Vital Signs Last 24 Hrs  T(C): 36.9, Max: 37.7 (12-21 @ 15:07)  T(F): 98.4, Max: 99.9 (12-21 @ 15:07)  HR: 89 (70 - 89)  BP: 136/61 (94/70 - 136/61)  BP(mean): --  RR: 18 (18 - 20)    GEN: NAD, pleasant  HEENT: normocephalic and atraumatic. EOMI. IVÁN...  NECK: Supple. No carotid bruits.  No lymphadenopathy or thyromegaly.  LUNGS: Clear to auscultation.  HEART: Regular rate and rhythm without murmur.  ABDOMEN: Soft, nontender, and nondistended.  Positive bowel sounds.  No hepatosplenomegaly was noted.  NO REBOUND NO GUARDING  EXTREMITIES: Without any cyanosis, clubbing, rash, lesions or edema.  NEUROLOGIC: Cranial nerves II through XII are grossly intact.    SKIN: No ulceration or induration present.      Labs:  19 Dec 2016 06:47    142    |  113    |  20.0   ----------------------------<  107    3.4     |  18.0   |  0.69     Ca    7.4        19 Dec 2016 06:47  Mg     1.4       19 Dec 2016 06:47                            9.5    27.28 )-----------( 283      ( 19 Dec 2016 06:47 )             27.0                 CAPILLARY BLOOD GLUCOSE  162 (19 Dec 2016 17:41)  121 (19 Dec 2016 12:12)  107 (19 Dec 2016 07:58)  144 (18 Dec 2016 21:53)        RECENT CULTURES:  12-16 .Blood Blood XXXX XXXX   No growth at 48 hours    12-14 .Urine Catheterized Enterobacter cloacae  Escherichia coli (multi drug resistant)  Enterococcus faecalis XXXX   50,000 CFU/ml Enterobacter cloacae  50,000 CFU/ml Escherichia coli (multi drug resistant)  30,000 CFU/ml Enterococcus faecalis  .  TYPE: (C=Critical, N=Notification, A=Abnormal) c  TESTS:  _ E coli MDRO  DATE/TIME CALLED: _ 12/17/2016 10:49:09  CALL    12-13 .Stool Feces XXXX XXXX   No enteric gram negative rods isolated  No enteric pathogens isolated.  (Stool culture examined for Salmonella,  Shigella, Campylobacter, Aeromonas, Plesiomonas,  Vibrio, E.coli O157 and Yersinia)    12-13 .Blood Blood-Peripheral XXXX XXXX   No growth at 5 days.

## 2016-12-22 NOTE — PROGRESS NOTE ADULT - ASSESSMENT
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 Diff colitis with stool losses , Cr improved   Metastatic Uterine Sarcoma, has B/L Car[place] on CT with h/o retention saul now in place with good UOP ( + bladder irrigation)  Continue IV Flagyl and enteral vanco  Gyn-Onc and urology follow up noted   Metabolic Acidosis 2/2 CADEN improved   Hypomagnesemia replaced  Urology follow up

## 2016-12-23 NOTE — PROGRESS NOTE ADULT - SUBJECTIVE AND OBJECTIVE BOX
Patient is a 58y old  Female who presents with a chief complaint of UTI, urinary retention. (22 Dec 2016 17:44)  now sleeping and slightly arousable       ANTIMICROBIAL:  vancomycin    Solution 250milliGRAM(s) Oral every 6 hours  nystatin    Suspension [id number](s) Swish and Swallow three times a day  meropenem IVPB 500milliGRAM(s) IV Intermittent every 12 hours    CARDIOVASCULAR:  metoprolol succinate ER 50milliGRAM(s) Oral daily  tamsulosin 0.4milliGRAM(s) Oral at bedtime    NEUROLOGIC:  acetaminophen   Tablet. 650milliGRAM(s) Oral every 6 hours PRN    ENDO/METABOLIC:  insulin lispro (HumaLOG) corrective regimen sliding scale  SubCutaneous three times a day before meals  dextrose Gel 1Dose(s) Oral once PRN  dextrose 50% Injectable 12.5Gram(s) IV Push once  dextrose 50% Injectable 25Gram(s) IV Push once  dextrose 50% Injectable 25Gram(s) IV Push once  glucagon  Injectable 1milliGRAM(s) IntraMuscular once PRN    IV FLUID/NUTRITION:  dextrose 5%. 1000milliLiter(s) IV Continuous <Continuous>  dextrose 5%. 1000milliLiter(s) IV Continuous <Continuous>    TOPICAL:  benzocaine 15 mG/menthol 3.6 mG Lozenge 1Lozenge Oral three times a day PRN      Allergies  No Known Allergies      Vital Signs Last 24 Hrs  T(C): 37.3, Max: 37.3 ( @ 08:06)  T(F): 99.2, Max: 99.2 ( @ 08:06)  HR: 63 (63 - 80)  BP: 128/62 (104/54 - 134/63)  BP(mean): --  RR: 18 (18 - 20)  SpO2: --      ROS  diarrhea resolved  no abd pain        PHYSICAL EXAM:    GENERAL: NAD, well-groomed, well-developed  HEAD:  Atraumatic, Normocephalic  EYES: EOMI, PERRLA, conjunctiva and sclera clear  ENMT: No tonsillar erythema, exudates, or enlargement; Moist mucous membranes, Good dentition, No lesions  NECK: Supple, No JVD, Normal thyroid  NERVOUS SYSTEM:  Alert & Oriented X3,   CHEST/LUNG: Clear to percussion bilaterally; No rales, rhonchi, wheezing, or rubs  HEART: Regular  ABDOMEN: Soft, Nontender,   EXTREMITIES:  2+ edema  LYMPH: No lymphadenopathy noted        LABS:                        9.5    56.16 )-----------( 222      ( 23 Dec 2016 06:50 )             27.0     CBC Full  -  ( 23 Dec 2016 06:50 )  WBC Count : 56.16 K/uL  Hemoglobin : 9.5 g/dL  Hematocrit : 27.0 %  Platelet Count - Automated : 222 K/uL  Mean Cell Volume : 84.4 fl  Mean Cell Hemoglobin : 29.7 pg  Mean Cell Hemoglobin Concentration : 35.2 g/dL  Auto Neutrophil # : 52.3 K/uL  Auto Lymphocyte # : 1.1 K/uL  Auto Monocyte # : 2.0 K/uL  Auto Eosinophil # : 0.0 K/uL  Auto Basophil # : 0.0 K/uL  Auto Neutrophil % : 90.0 %  Auto Lymphocyte % : 2.0 %  Auto Monocyte % : 5.0 %  Auto Eosinophil % : 1.0 %  Auto Basophil % : 0.0 %    23 Dec 2016 06:50    142    |  108    |  21.0   ----------------------------<  125    4.2     |  18.0   |  1.97     Ca    7.0        23 Dec 2016 06:50  Phos  3.7       22 Dec 2016 11:40  Mg     1.9       23 Dec 2016 06:50    TPro  4.5    /  Alb  1.5    /  TBili  0.4    /  DBili  x      /  AST  12     /  ALT  5      /  AlkPhos  511    23 Dec 2016 06:50    PT/INR - ( 22 Dec 2016 00:47 )   PT: 15.3 sec;   INR: 1.39 ratio         PTT - ( 22 Dec 2016 00:47 )  PTT:43.7 sec  Urinalysis Basic - ( 21 Dec 2016 14:08 )    Color: Red / Appearance: Bloody / S.020 / pH: x  Gluc: x / Ketone: Negative  / Bili: Negative / Urobili: Negative   Blood: x / Protein: 500 mg/dL / Nitrite: Negative   Leuk Esterase: Negative / RBC: TNTC /HPF / WBC 6-10 /HPF   Sq Epi: x / Non Sq Epi: Occasional / Bacteria: x      Hemoglobin A1C, Whole Blood: 6.2 % ( @ 01:06)  Hemoglobin A1C, Whole Blood: 5.7 % (10-01 @ 05:55)    Culture Results:   Growth in aerobic and anaerobic bottles: Gram Negative Rods  Aerobic Bottle: 12:07 Hours to positivity  Anaerobic Bottle: 12:17 Hours to positivity  ,  TYPE: (C=Critical, N=Notification, A=Abnormal) c  TESTS:  _   DATE/TIME CALLED: _ 2016 08 ( @ 11:40)  Culture Results:   >100,000 CFU/ml Gram Negative Rods     Albumin, Serum: 1.5 g/dL ( @ 06:50)    LIVER FUNCTIONS - ( 23 Dec 2016 06:50 )  Alb: 1.5 g/dL / Pro: 4.5 g/dL / ALK PHOS: 511 U/L / ALT: 5 U/L / AST: 12 U/L / GGT: x

## 2016-12-23 NOTE — PROGRESS NOTE ADULT - SUBJECTIVE AND OBJECTIVE BOX
NEPHROLOGY INTERVAL HPI/OVERNIGHT EVENTS:   Thirsty, not sob in bed.      MEDICATIONS  (STANDING):  vancomycin    Solution 250milliGRAM(s) Oral every 6 hours  insulin lispro (HumaLOG) corrective regimen sliding scale  SubCutaneous three times a day before meals  dextrose 5%. 1000milliLiter(s) IV Continuous <Continuous>  dextrose 50% Injectable 12.5Gram(s) IV Push once  dextrose 50% Injectable 25Gram(s) IV Push once  dextrose 50% Injectable 25Gram(s) IV Push once  metoprolol succinate ER 50milliGRAM(s) Oral daily  dextrose 5%. 1000milliLiter(s) IV Continuous <Continuous>  tamsulosin 0.4milliGRAM(s) Oral at bedtime  nystatin    Suspension [id number](s) Swish and Swallow three times a day  meropenem IVPB 500milliGRAM(s) IV Intermittent every 12 hours    MEDICATIONS  (PRN):  dextrose Gel 1Dose(s) Oral once PRN Blood Glucose LESS THAN 70 milliGRAM(s)/deciLiter  glucagon  Injectable 1milliGRAM(s) IntraMuscular once PRN Glucose <70 milliGRAM(s)/deciLiter  acetaminophen   Tablet. 650milliGRAM(s) Oral every 6 hours PRN headache/pain  benzocaine 15 mG/menthol 3.6 mG Lozenge 1Lozenge Oral three times a day PRN Sore Throat      Allergies    No Known Allergies    Intolerances        Vital Signs Last 24 Hrs  T(C): 37.3, Max: 37.3 ( @ 08:06)  T(F): 99.2, Max: 99.2 ( @ 08:06)  HR: 63 (63 - 80)  BP: 128/62 (104/54 - 134/63)  BP(mean): --  RR: 18 (18 - 20)  SpO2: --  Daily     Daily     PHYSICAL EXAM:    GENERAL: appears chronically ill  HEAD:    EYES:   ENMT:   NECK: veins flat  NERVOUS SYSTEM:    CHEST/LUNG: clear  HEART: no rub  ABDOMEN: soft  EXTREMITIES:  muscle wasting  LYMPH:   SKIN:    CBI noted, dark urine better  LABS:                        9.5    56.16 )-----------( 222      ( 23 Dec 2016 06:50 )             27.0     23 Dec 2016 06:50    142    |  108    |  21.0   ----------------------------<  125    4.2     |  18.0   |  1.97     Ca    7.0        23 Dec 2016 06:50  Phos  3.7       22 Dec 2016 11:40  Mg     1.9       23 Dec 2016 06:50    TPro  4.5    /  Alb  1.5    /  TBili  0.4    /  DBili  x      /  AST  12     /  ALT  5      /  AlkPhos  511    23 Dec 2016 06:50    PT/INR - ( 22 Dec 2016 00:47 )   PT: 15.3 sec;   INR: 1.39 ratio         PTT - ( 22 Dec 2016 00:47 )  PTT:43.7 sec  Urinalysis Basic - ( 21 Dec 2016 14:08 )    Color: Red / Appearance: Bloody / S.020 / pH: x  Gluc: x / Ketone: Negative  / Bili: Negative / Urobili: Negative   Blood: x / Protein: 500 mg/dL / Nitrite: Negative   Leuk Esterase: Negative / RBC: TNTC /HPF / WBC 6-10 /HPF   Sq Epi: x / Non Sq Epi: Occasional / Bacteria: x      Magnesium, Serum: 1.9 mg/dL ( @ 06:50)          RADIOLOGY & ADDITIONAL TESTS:

## 2016-12-23 NOTE — PROGRESS NOTE ADULT - SUBJECTIVE AND OBJECTIVE BOX
TRAN MCDONALD is a 58y Female with  with uterine SARCOMA   WITH C DIFF  WITH HEMATURIA S/P TRANSFUSION YESTERDAY X2 AND TODAY  WBC 50  TODAY  PT STATES DIARRHEA RESOLVED  FEELS BETTER  BUT NOW WITH GM NEG BACTEREMIA  AND UTI    Allergies:  No Known Allergies      Medications:  vancomycin    Solution 250milliGRAM(s) Oral every 6 hours  dronabinol 2.5milliGRAM(s) Oral two times a day  ondansetron Injectable 4milliGRAM(s) IV Push every 6 hours PRN  insulin lispro (HumaLOG) corrective regimen sliding scale  SubCutaneous three times a day before meals  dextrose 5%. 1000milliLiter(s) IV Continuous <Continuous>  dextrose Gel 1Dose(s) Oral once PRN  dextrose 50% Injectable 12.5Gram(s) IV Push once  dextrose 50% Injectable 25Gram(s) IV Push once  dextrose 50% Injectable 25Gram(s) IV Push once  glucagon  Injectable 1milliGRAM(s) IntraMuscular once PRN  metoprolol succinate ER 50milliGRAM(s) Oral daily  metroNIDAZOLE  IVPB  IV Intermittent   dextrose 5%. 1000milliLiter(s) IV Continuous <Continuous>  metroNIDAZOLE  IVPB 500milliGRAM(s) IV Intermittent every 8 hours  acetaminophen   Tablet. 650milliGRAM(s) Oral every 6 hours PRN  tamsulosin 0.4milliGRAM(s) Oral at bedtime  enoxaparin Injectable 70milliGRAM(s) SubCutaneous every 12 hours  sodium chloride 0.45% 1000milliLiter(s) IV Continuous <Continuous>      ANTIBIOTICS: ORAL  VANCO MERREM        Review of Systems: - Negative except as mentioned above     Physical Exam:  ICU Vital Signs Last 24 Hrs  T(Vital Signs Last 24 Hrs  T(C): 36.9, Max: 37.7 (12-21 @ 15:07)  T(F): 98.4, Max: 99.9 (12-21 @ 15:07)  HR: 89 (70 - 89)  BP: 136/61 (94/70 - 136/61)  BP(mean): --  RR: 18 (18 - 20)    GEN: NAD, pleasant  HEENT: normocephalic and atraumatic. EOMI. IVÁN...  NECK: Supple. No carotid bruits.  No lymphadenopathy or thyromegaly.  LUNGS: Clear to auscultation.  HEART: Regular rate and rhythm without murmur.  ABDOMEN: Soft, nontender, and nondistended.  Positive bowel sounds.  No hepatosplenomegaly was noted.  NO REBOUND NO GUARDING  EXTREMITIES: Without any cyanosis, clubbing, rash, lesions or edema.  NEUROLOGIC: Cranial nerves II through XII are grossly intact.    SKIN: No ulceration or induration present.      Labs:  19 Dec 2016 06:47    142    |  113    |  20.0   ----------------------------<  107    3.4     |  18.0   |  0.69     Ca    7.4        19 Dec 2016 06:47  Mg     1.4       19 Dec 2016 06:47                            9.5    27.28 )-----------( 283      ( 19 Dec 2016 06:47 )             27.0                 CAPILLARY BLOOD GLUCOSE  162 (19 Dec 2016 17:41)  121 (19 Dec 2016 12:12)  107 (19 Dec 2016 07:58)  144 (18 Dec 2016 21:53)        RECENT CULTURES:  12-16 .Blood Blood XXXX XXXX   No growth at 48 hours    12-14 .Urine Catheterized Enterobacter cloacae  Escherichia coli (multi drug resistant)  Enterococcus faecalis XXXX   50,000 CFU/ml Enterobacter cloacae  50,000 CFU/ml Escherichia coli (multi drug resistant)  30,000 CFU/ml Enterococcus faecalis  .  TYPE: (C=Critical, N=Notification, A=Abnormal) c  TESTS:  _ E coli MDRO  DATE/TIME CALLED: _ 12/17/2016 10:49:09  CALL    12-13 .Stool Feces XXXX XXXX   No enteric gram negative rods isolated  No enteric pathogens isolated.  (Stool culture examined for Salmonella,  Shigella, Campylobacter, Aeromonas, Plesiomonas,  Vibrio, E.coli O157 and Yersinia)    12-13 .Blood Blood-Peripheral XXXX XXXX   No growth at 5 days.

## 2016-12-23 NOTE — PROGRESS NOTE ADULT - ASSESSMENT
57 yo F with h/o HTN, DM, Dyslipidemia, s/p Bariatric Surgery with lap band, uterine cancer, cervical cancer, uterine sarcoma and had bacteremia in last september, complicated by PE and urinary retention, uterine Sarcoma, s/p Chemotherapy and has gross hematuria, receiving PRBC transfusion and continuos bladder irrigation is being scheduled for possible cysto with fulguration and biopsy. She had outpatient cardiology ROJO with Dr. Yi's group approximately 2 years ago which included nuclear stress test and Echo which are reportedly normal as per patient. She never had h/o MI or CAD. She never had cardiac catheterization. She denied CP. She denied dyspnea, orthopnea PND, edema, palpitation, syncope, near syncope. She denied exertional symptoms.    1) Preoperative Cardiovascular Examination  2) Anemia  3) Hematuria  4) HTN  5) h/o PE  6) DM  7) Uterine Sarcoma  8) Dyslipidemia    TTE showed normal EF. No significant valvular abnormalities.   Patient is cleared for the planned surgery from cardiac standpoint due to urgency of the surgery.  Transfuse as needed  FU CBC.

## 2016-12-23 NOTE — CHART NOTE - NSCHARTNOTEFT_GEN_A_CORE
VASCULAR SURGERY POST OPERATIVE ASSESSMENT    59 yo female seen and evaluated s/p cystoscopy with biopsy and fulguration of bladder, transurethral resection of bladder. Patient found sleeping comfortably in bed, no complaints. Denies abdominal pain at this time.     General - Sleeping in bed, awoken by light touch.   - CBI running, 50cc in bag, nurse present at time endorses emptying bag at 23:30.  Abdomen - soft, NT, ND.     A/P:    Continue bladder irrigation via 3-way saul catheter.   Dr. Lewis to discuss further management with patient and family in the am. GENERAL SURGERY/ UROLOGY POST OPERATIVE ASSESSMENT    59 yo female seen and evaluated s/p cystoscopy with biopsy and fulguration of bladder, transurethral resection of bladder. Patient found sleeping comfortably in bed, no complaints. Denies abdominal pain at this time.     General - Sleeping in bed, awoken by light touch.   - CBI running, 50cc in bag, nurse present at time endorses emptying bag at 23:30.  Abdomen - soft, NT, ND.     A/P:    Continue bladder irrigation via 3-way saul catheter.   Dr. Lewis to discuss further management with patient and family in the am.

## 2016-12-23 NOTE — PROGRESS NOTE ADULT - SUBJECTIVE AND OBJECTIVE BOX
Patient seen post cystoscopy with biopsy and fulguration   On cysto - left lateral wall invasion with a large clot  Patient overall feeling well.   No complaints at this point  Continues bladder irrigation  Will hold lovenox at this point  Getting meropenem for gram negative bacteremia

## 2016-12-23 NOTE — BRIEF OPERATIVE NOTE - OPERATION/FINDINGS
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    >500 cc of blood clot evacuated from the bladder.

## 2016-12-23 NOTE — PROGRESS NOTE ADULT - ASSESSMENT
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

## 2016-12-23 NOTE — PROGRESS NOTE ADULT - SUBJECTIVE AND OBJECTIVE BOX
GYNECOLOGIC ONCOLOGY PROGRESS NOTE    POD#    PROBLEMS:    Anemia due to blood loss  Hematuria, gross  Urinary tract infection without hematuria, site unspecified  Clostridium difficile colitis  Urinary retention  Pulmonary embolism, other  Uterine sarcoma      Pt seen and examined at bedside. Feeling better this morning.  Did not have diarrhea for more than 2 days but then this morning an episode of diarrhea.   Continues on a prolonged regimen of Vancomycin PO  Received blood yesterday, Hb 9.5 this AM  still bleeding from bladder, getting CBI  Planned for cystoscopy this afternoon.   WBC started trending down. Now 56k  On exam:  Abdomen soft, not tender, BS +  Extremities - no calf tenderness  Saul in place, diluted blood in saul  Lungs - CTA        OBJECTIVE:     VITALS:  T(F): 99.2, Max: 99.2 (- @ 08:06)  HR: 63 (63 - 89)  BP: 128/62 (104/54 - 136/61)  RR: 18 (18 - 20)      MEDICATIONS  (STANDING):  vancomycin    Solution 250milliGRAM(s) Oral every 6 hours  insulin lispro (HumaLOG) corrective regimen sliding scale  SubCutaneous three times a day before meals  dextrose 5%. 1000milliLiter(s) IV Continuous <Continuous>  dextrose 50% Injectable 12.5Gram(s) IV Push once  dextrose 50% Injectable 25Gram(s) IV Push once  dextrose 50% Injectable 25Gram(s) IV Push once  metoprolol succinate ER 50milliGRAM(s) Oral daily  dextrose 5%. 1000milliLiter(s) IV Continuous <Continuous>  tamsulosin 0.4milliGRAM(s) Oral at bedtime  nystatin    Suspension 282997Gyss(s) Swish and Swallow three times a day        LABS:                        9.5    56.16 )-----------( 222      ( 23 Dec 2016 06:50 )             27.0     23 Dec 2016 06:50    142    |  108    |  21.0   ----------------------------<  125    4.2     |  18.0   |  1.97     Ca    7.0        23 Dec 2016 06:50  Phos  3.7       22 Dec 2016 11:40  Mg     1.9       23 Dec 2016 06:50    TPro  4.5    /  Alb  1.5    /  TBili  0.4    /  DBili  x      /  AST  12     /  ALT  5      /  AlkPhos  511    23 Dec 2016 06:50    PT/INR - ( 22 Dec 2016 00:47 )   PT: 15.3 sec;   INR: 1.39 ratio         PTT - ( 22 Dec 2016 00:47 )  PTT:43.7 sec  Urinalysis Basic - ( 21 Dec 2016 14:08 )    Color: Red / Appearance: Bloody / S.020 / pH: x  Gluc: x / Ketone: Negative  / Bili: Negative / Urobili: Negative   Blood: x / Protein: 500 mg/dL / Nitrite: Negative   Leuk Esterase: Negative / RBC: TNTC /HPF / WBC 6-10 /HPF   Sq Epi: x / Non Sq Epi: Occasional / Bacteria: x

## 2016-12-23 NOTE — BRIEF OPERATIVE NOTE - PROCEDURE
Cystoscopy with biopsy and fulguration of bladder  12/23/2016    Active  [facility]5  Transurethral resection of bladder  12/23/2016    Active  [facility]5

## 2016-12-23 NOTE — PROGRESS NOTE ADULT - SUBJECTIVE AND OBJECTIVE BOX
CARDIOLOGY PROGRESS NOTE   ([facility])                                                                                                        Subjective: NO new c/o, No CP, Dyspnea,, still Diarrhea    Vitals:  T(C): 37.3, Max: 37.3 (12-23 @ 08:06)  HR: 63 (63 - 89)  BP: 128/62 (104/54 - 136/61)  RR: 18 (18 - 20)  SpO2: --  Wt(kg): --  I&O's Summary        PHYSICAL EXAM:  Appearance: Normal	  HEENT:   Atraumatic  Cardiovascular: Normal S1 S2, No JVD, No murmurs, No edema  Respiratory: Lungs clear to auscultation	  Gastrointestinal:  Soft, Non-tender, + BS	  Skin: No rashes, No ecchymoses, No cyanosis  Neurologic: Alert and awake, able to move extremities  Extremities: No edema          CURRENT MEDICATIONS:  metoprolol succinate ER 50milliGRAM(s) Oral daily  tamsulosin 0.4milliGRAM(s) Oral at bedtime    vancomycin    Solution 250milliGRAM(s) Oral every 6 hours  nystatin    Suspension 982618Gkbr(s) Swish and Swallow three times a day      acetaminophen   Tablet. 650milliGRAM(s) Oral every 6 hours PRN      insulin lispro (HumaLOG) corrective regimen sliding scale  SubCutaneous three times a day before meals  dextrose Gel 1Dose(s) Oral once PRN  dextrose 50% Injectable 12.5Gram(s) IV Push once  dextrose 50% Injectable 25Gram(s) IV Push once  dextrose 50% Injectable 25Gram(s) IV Push once  glucagon  Injectable 1milliGRAM(s) IntraMuscular once PRN    dextrose 5%. 1000milliLiter(s) IV Continuous <Continuous>  dextrose 5%. 1000milliLiter(s) IV Continuous <Continuous>  benzocaine 15 mG/menthol 3.6 mG Lozenge 1Lozenge Oral three times a day PRN      LABS:	 	    CARDIAC MARKERS:                          9.5    56.16 )-----------( 222      ( 23 Dec 2016 06:50 )             27.0     23 Dec 2016 06:50    142    |  108    |  21.0   ----------------------------<  125    4.2     |  18.0   |  1.97     Ca    7.0        23 Dec 2016 06:50  Phos  3.7       22 Dec 2016 11:40  Mg     1.9       23 Dec 2016 06:50    TPro  4.5    /  Alb  1.5    /  TBili  0.4    /  DBili  x      /  AST  12     /  ALT  5      /  AlkPhos  511    23 Dec 2016 06:50    EXAM:  ECHO TRANSTHORACIC COMP W DOPP      PROCEDURE DATE:  Dec 22 2016   .      INTERPRETATION:  REPORT:    TRANSTHORACIC ECHOCARDIOGRAM REPORT           Patient Name:   TRAN MCDONALD Patient Location: Inpatient  Medical Rec #:  [id number]         Accession #:      [id number]  Account #:                       Height:           60.6 in 154.0 cm  YOB: 1958        Weight:           209.4 lb 95.00 kg  Patient Age:    58 years         BSA:              1.92 m²  Patient Gender: F                BP:               123/60 mmHg        Date of Exam:        12/22/2016 2:26:10 PM  Sonographer:         Devora Taylor  Referring Physician: Florentino Calvo MD     Procedure:     2D Echo/Doppler/Color Doppler Complete.  Indications:   Encounter for preprocedural cardiovascular examination -   Z01.810  Diagnosis:     Nonrheumatic mitral (valve) insufficiency - I34.0  Study Details: Technically difficult study. Study quality was adversely   affected                 due to body habitus and Bed side echo.           2D AND M-MODE MEASUREMENTS (normal ranges within parentheses):  Left                 Normal   Aorta/Left            Normal  Ventricle:                    Atrium:  IVSd (Mmode): 1.24  (0.7-1.1) Aortic Root  3.54 cm (2.4-3.7)                 cm             (Mmode):  LVPWd         1.24  (0.7-1.1) LA Volume     22.9  (Mmode):       cm             Index         ml/m²  LVIDd         5.21  (3.4-5.7)  (Mmode):       cm  LVIDs         2.73  (Mmode):       cm  LV FS         47.6   (>25%)  (Mmode):        %  Rel. Wall     0.48   (<0.42)  Thickness Mm  LV Mass       136.4  Index: Mmode  g/m²     LV DIASTOLIC FUNCTION:  MV Peak E: 0.72 m/s e', MV Sharmin: 0.07 m/s  MV Peak A: 0.89 m/s E/e' Ratio: 10.29  E/A Ratio: 0.81     SPECTRAL DOPPLER ANALYSIS (where applicable):  Tricuspid Valve and PA/RV Systolic Pressure: TR Max Velocity: 2.90 m/s RA   Pressure: 3 mmHg RVSP/PASP: 36.6 mmHg        PHYSICIAN INTERPRETATION:  Left Ventricle: The left ventricular internal cavity size is normal. Left   ventricular wall thickness is mildly increased. There is mild evidence of   left ventricular hypertrophy.  Global LV systolic function was normal. Left ventricular ejection   fraction, by visual estimation, is 70%. Spectral Doppler shows impaired   relaxation pattern of left ventricular myocardial filling (Grade I   diastolic dysfunction).  Right Ventricle: The right ventricular size is normal. RV systolic   function is normal.  Left Atrium: Normal left atrial size.  Right Atrium: The right atrium is normal in size.  Pericardium: There is no evidence of pericardial effusion.  Mitral Valve: The mitral valve is normal in structure. Trace mitral valve   regurgitation is seen.  Tricuspid Valve: The tricuspid valve is normal in structure. Trivial   tricuspid regurgitation is visualized. Estimated pulmonary artery   systolic pressure is 36.6 mmHg assuming a right atrial pressure of 3   mmHg, which is consistent with borderline pulmonary hypertension.  Aortic Valve: The aortic valve is trileaflet. No evidence of aortic valve   regurgitation is seen.  Pulmonic Valve: Structurally normal pulmonic valve, with normal leaflet   excursion. Trace pulmonic valve regurgitation.  Aorta: The aortic root is normal in size and structure.  Pulmonary Artery: The main pulmonary artery is normal in size.  Venous: The inferior vena cava was normal sized, with respiratory size   variation greater than 50%.        Summary:   1. Left ventricular ejection fraction, by visual estimation, is 70%.   2. Normal global left ventricular systolic function.   3. Mildly increased LV wall thickness.   4. Spectral Doppler shows impaired relaxation pattern of left   ventricular myocardial filling (Grade I diastolic dysfunction).   5. There is mild evidence of left ventricular hypertrophy.   6. Estimated pulmonary artery systolic pressure is 36.6 mmHg assuming a   right atrial pressure of 3 mmHg, which is consistent with borderline   pulmonary hypertension.     MD Skyler Electronically signed on 12/22/2016 at 6:44:27 PM                *** Final ***                  III TERI GOLDEN   This document has been electronically signed. Dec 22 2016  2:26PM

## 2016-12-24 NOTE — PROGRESS NOTE ADULT - ASSESSMENT
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

## 2016-12-24 NOTE — PROGRESS NOTE ADULT - SUBJECTIVE AND OBJECTIVE BOX
POD#1 s/p Cystoscopy w/biopsy, fulguration of bladder , transurethral resection of bladder  Currently without complaints. CBI continues with tea colored UOP, much improved.   Pt denies c/o pain/discomfort.   No diarrhea overnight, refusing PO Vanco.     On Exam: VSS, no fever , sleeping but arousable   Chest: diminished bs   Abd: soft, NT, ND  : +Manning w/600cc tea colored urine  Ext: no SCD boots, no cafl tenderness    Labs:   WBC - 43 (elevated but improved)   hgb 9.1 (stable), all other labs stable.     BCX : positive for gram neg rods   UCX: positive for gram neg rods   sensitivity pending on both   ID following, on Meropenem     A/P: Will s/w Dr. Paez today about considering a CBI clamp trial   Will also round with Dr. BARBARA Lewis today   Discuss need to Continue PO Vanco for cdiff colitis - asx and pt refusing

## 2016-12-24 NOTE — PROVIDER CONTACT NOTE (CRITICAL VALUE NOTIFICATION) - ACTION/TREATMENT ORDERED:
replacement therapy will be ordered
None
Surgical team made aware, no new orders at this time.
vanco enema was ordered

## 2016-12-24 NOTE — PROVIDER CONTACT NOTE (CRITICAL VALUE NOTIFICATION) - SITUATION
Advised Betty LERMA of Calcium 6.7
advised Betty LERMA WBC 41.35
Advised Deyanira LERMA for Lewis WBC elevated 50.08
No instructions provided, Stephanie just did a read back.
advised eulalio of positive ecoli in urine MDR
cornel GRANGER. left message with dr. christianson, per jessenia whelan will call right back.
no orders at this time
no orders at this time
Lab drawn this morning wbc 43.94

## 2016-12-24 NOTE — PROGRESS NOTE ADULT - SUBJECTIVE AND OBJECTIVE BOX
TRAN MCDONALD is a 58y Female with  with uterine SARCOMA   WITH C DIFF  WITH HEMATURIA S/P TRANSFUSIONS  S/P OR   PT STATES DIARRHEA RESOLVED  FEELS BETTER DOESNT WANT  ORAL VANC  BUT NOW WITH GM NEG BACTEREMIA  AND UTI  WITH MDR ENTEROBACTER    Allergies:  No Known Allergies      Medications:  vancomycin    Solution 250milliGRAM(s) Oral every 6 hours  dronabinol 2.5milliGRAM(s) Oral two times a day  ondansetron Injectable 4milliGRAM(s) IV Push every 6 hours PRN  insulin lispro (HumaLOG) corrective regimen sliding scale  SubCutaneous three times a day before meals  dextrose 5%. 1000milliLiter(s) IV Continuous <Continuous>  dextrose Gel 1Dose(s) Oral once PRN  dextrose 50% Injectable 12.5Gram(s) IV Push once  dextrose 50% Injectable 25Gram(s) IV Push once  dextrose 50% Injectable 25Gram(s) IV Push once  glucagon  Injectable 1milliGRAM(s) IntraMuscular once PRN  metoprolol succinate ER 50milliGRAM(s) Oral daily  metroNIDAZOLE  IVPB  IV Intermittent   dextrose 5%. 1000milliLiter(s) IV Continuous <Continuous>  metroNIDAZOLE  IVPB 500milliGRAM(s) IV Intermittent every 8 hours  acetaminophen   Tablet. 650milliGRAM(s) Oral every 6 hours PRN  tamsulosin 0.4milliGRAM(s) Oral at bedtime  enoxaparin Injectable 70milliGRAM(s) SubCutaneous every 12 hours  sodium chloride 0.45% 1000milliLiter(s) IV Continuous <Continuous>      ANTIBIOTICS: ORAL  VANCO POLMYXIN        Review of Systems: - Negative except as mentioned above     Physical Exam:  ICU Vital Signs Last 24 Hrs  T(Vital Signs Last 24 Hrs  T(C): 36.9, Max: 37.7 (12-21 @ 15:07)  T(F): 98.4, Max: 99.9 (12-21 @ 15:07)  HR: 89 (70 - 89)  BP: 136/61 (94/70 - 136/61)  BP(mean): --  RR: 18 (18 - 20)    GEN: NAD, pleasant  HEENT: normocephalic and atraumatic. EOMI. IVÁN...  NECK: Supple. No carotid bruits.  No lymphadenopathy or thyromegaly.  LUNGS: Clear to auscultation.  HEART: Regular rate and rhythm without murmur.  ABDOMEN: Soft, nontender, and nondistended.  Positive bowel sounds.  No hepatosplenomegaly was noted.  NO REBOUND NO GUARDING  EXTREMITIES: Without any cyanosis, clubbing, rash, lesions or edema.  NEUROLOGIC: Cranial nerves II through XII are grossly intact.    SKIN: No ulceration or induration present.      Labs:  19 Dec 2016 06:47    142    |  113    |  20.0   ----------------------------<  107    3.4     |  18.0   |  0.69     Ca    7.4        19 Dec 2016 06:47  Mg     1.4       19 Dec 2016 06:47                            9.5    27.28 )-----------( 283      ( 19 Dec 2016 06:47 )             27.0                 CAPILLARY BLOOD GLUCOSE  162 (19 Dec 2016 17:41)  121 (19 Dec 2016 12:12)  107 (19 Dec 2016 07:58)  144 (18 Dec 2016 21:53)        RECENT CULTURES:  12-16 .Blood Blood XXXX XXXX   No growth at 48 hours    12-14 .Urine Catheterized Enterobacter cloacae  Escherichia coli (multi drug resistant)  Enterococcus faecalis XXXX   50,000 CFU/ml Enterobacter cloacae  50,000 CFU/ml Escherichia coli (multi drug resistant)  30,000 CFU/ml Enterococcus faecalis  .  TYPE: (C=Critical, N=Notification, A=Abnormal) c  TESTS:  _ E coli MDRO  DATE/TIME CALLED: _ 12/17/2016 10:49:09  CALL    12-13 .Stool Feces XXXX XXXX   No enteric gram negative rods isolated  No enteric pathogens isolated.  (Stool culture examined for Salmonella,  Shigella, Campylobacter, Aeromonas, Plesiomonas,  Vibrio, E.coli O157 and Yersinia)    12-13 .Blood Blood-Peripheral XXXX XXXX   No growth at 5 days.

## 2016-12-24 NOTE — PROGRESS NOTE ADULT - SUBJECTIVE AND OBJECTIVE BOX
HPI:  pt with uterine sarcoma. gross hematuria and G- sepsis  refusing Vanco for several days now     PAST MEDICAL & SURGICAL HISTORY:  Uterine sarcoma  Cervical cancer  Uterine cancer  Dyslipidemia  Essential hypertension, hypertension with unspecified goal  Diabetes  S/P bariatric surgery: lap band      ANTIMICROBIAL:  vancomycin    Solution 250milliGRAM(s) Oral every 6 hours  nystatin    Suspension [id number](s) Swish and Swallow three times a day  polymyxin B IVPB 343886Iijt(s) IV Intermittent every 12 hours    CARDIOVASCULAR:  metoprolol succinate ER 50milliGRAM(s) Oral daily  tamsulosin 0.4milliGRAM(s) Oral at bedtime    NEUROLOGIC:  acetaminophen   Tablet. 650milliGRAM(s) Oral every 6 hours PRN    ENDO/METABOLIC:  insulin lispro (HumaLOG) corrective regimen sliding scale  SubCutaneous three times a day before meals  dextrose Gel 1Dose(s) Oral once PRN  dextrose 50% Injectable 12.5Gram(s) IV Push once  dextrose 50% Injectable 25Gram(s) IV Push once  dextrose 50% Injectable 25Gram(s) IV Push once  glucagon  Injectable 1milliGRAM(s) IntraMuscular once PRN    IV FLUID/NUTRITION:  dextrose 5%. 1000milliLiter(s) IV Continuous <Continuous>  dextrose 5%. 1000milliLiter(s) IV Continuous <Continuous>  sodium chloride 0.225% 1000milliLiter(s) IV Continuous <Continuous>  magnesium sulfate  IVPB 1Gram(s) IV Intermittent once    TOPICAL:  benzocaine 15 mG/menthol 3.6 mG Lozenge 1Lozenge Oral three times a day PRN      Allergies  No Known Allergies    FAMILY HISTORY:  Family history of coronary artery disease      Vital Signs Last 24 Hrs  T(C): 36.8, Max: 36.8 (12-23 @ 21:43)  T(F): 98.3, Max: 98.3 (12-24 @ 05:09)  HR: 80 (65 - 86)  BP: 142/61 (122/61 - 146/54)  BP(mean): --  RR: 18 (14 - 18)  SpO2: 93% (93% - 100%)    CONSTITUTIONAL: No fever, weight loss, or fatigue  EYES: No eye pain, visual disturbances, or discharge  ENMT:  No difficulty hearing, tinnitus, vertigo; No sinus or throat pain  NECK: No pain or stiffness  RESPIRATORY: No cough, wheezing, chills or hemoptysis; No shortness of breath  CARDIOVASCULAR: No chest pain, palpitations, dizziness, or leg swelling  GASTROINTESTINAL: No nausea, vomiting  GENITOURINARY:  hematuria    PHYSICAL EXAM:    GENERAL: NAD, well-groomed, well-developed  HEAD:  Atraumatic, Normocephalic  EYES: EOMI, PERRLA, conjunctiva and sclera clear  ENMT: No tonsillar erythema, exudates, or enlargement; Moist mucous membranes, Good dentition, No lesions  NECK: Supple, No JVD, Normal thyroid  NERVOUS SYSTEM:  Alert & Oriented X3,  CHEST/LUNG: Clear to percussion bilaterally; No rales, rhonchi, wheezing, or rubs  HEART: Regular rate and rhythm; No murmurs, rubs, or gallops  ABDOMEN: Soft  EXTREMITIES:  mild edema        LABS:                        9.1    43.94 )-----------( 253      ( 24 Dec 2016 06:28 )             25.9     24 Dec 2016 06:28    141    |  106    |  22.0   ----------------------------<  91     3.8     |  22.0   |  1.38     Ca    6.7        24 Dec 2016 06:28  Phos  3.6       24 Dec 2016 06:28  Mg     1.7       24 Dec 2016 06:28    TPro  4.5    /  Alb  1.5    /  TBili  0.4    /  DBili  x      /  AST  12     /  ALT  5      /  AlkPhos  511    23 Dec 2016 06:50        G- bacteremia

## 2016-12-24 NOTE — PROGRESS NOTE ADULT - SUBJECTIVE AND OBJECTIVE BOX
NEPHROLOGY INTERVAL HPI/OVERNIGHT EVENTS:    MEDICATIONS  (STANDING):  vancomycin    Solution 250milliGRAM(s) Oral every 6 hours  insulin lispro (HumaLOG) corrective regimen sliding scale  SubCutaneous three times a day before meals  dextrose 5%. 1000milliLiter(s) IV Continuous <Continuous>  dextrose 50% Injectable 12.5Gram(s) IV Push once  dextrose 50% Injectable 25Gram(s) IV Push once  dextrose 50% Injectable 25Gram(s) IV Push once  metoprolol succinate ER 50milliGRAM(s) Oral daily  dextrose 5%. 1000milliLiter(s) IV Continuous <Continuous>  tamsulosin 0.4milliGRAM(s) Oral at bedtime  nystatin    Suspension 498237Cven(s) Swish and Swallow three times a day  sodium chloride 0.225% 1000milliLiter(s) IV Continuous <Continuous>    MEDICATIONS  (PRN):  dextrose Gel 1Dose(s) Oral once PRN Blood Glucose LESS THAN 70 milliGRAM(s)/deciLiter  glucagon  Injectable 1milliGRAM(s) IntraMuscular once PRN Glucose <70 milliGRAM(s)/deciLiter  acetaminophen   Tablet. 650milliGRAM(s) Oral every 6 hours PRN headache/pain  benzocaine 15 mG/menthol 3.6 mG Lozenge 1Lozenge Oral three times a day PRN Sore Throat      Allergies    No Known Allergies    Intolerances        Vital Signs Last 24 Hrs  T(C): 36.8, Max: 36.8 (12-23 @ 21:43)  T(F): 98.3, Max: 98.3 (12-24 @ 05:09)  HR: 80 (65 - 86)  BP: 142/61 (122/61 - 146/54)  BP(mean): --  RR: 18 (14 - 18)  SpO2: 93% (93% - 100%)  Daily Height in cm: 154.9 (23 Dec 2016 15:35)    Daily     PHYSICAL EXAM:    GENERAL:   HEAD:    EYES:   ENMT:   NECK:   NERVOUS SYSTEM:    CHEST/LUNG:same  HEART:   ABDOMEN: same  EXTREMITIES:  iv meds  LYMPH:   SKIN:     LABS:                        9.1    43.94 )-----------( 253      ( 24 Dec 2016 06:28 )             25.9     24 Dec 2016 06:28    141    |  106    |  22.0   ----------------------------<  91     3.8     |  22.0   |  1.38     Ca    7.0        23 Dec 2016 06:50  Phos  3.6       24 Dec 2016 06:28  Mg     1.7       24 Dec 2016 06:28    TPro  4.5    /  Alb  1.5    /  TBili  0.4    /  DBili  x      /  AST  12     /  ALT  5      /  AlkPhos  511    23 Dec 2016 06:50        Magnesium, Serum: 1.7 mg/dL (12-24 @ 06:28)  Phosphorus Level, Serum: 3.6 mg/dL (12-24 @ 06:28)          RADIOLOGY & ADDITIONAL TESTS:

## 2016-12-24 NOTE — CHART NOTE - NSCHARTNOTEFT_GEN_A_CORE
CBI clamped this AM, but Rn reports return of hematuria  so CBI resumed  Also, pt made DNR today and discussion of probable hospice   ID following for +ucx and bcx - will need special abx requesting from [facility]  organism is pan resistant , will need about 2 weeks of therapy for bacteremia as per Carmen.   Awaiting definitive sensitivities from lab for organism to decide on abx therapy..       Hospice consult called, will be seen on Tuesday after the holiday. CBI clamped this AM, but Rn reports return of hematuria  so CBI resumed  Also, pt made DNR today and discussion of probable hospice   ID following for +ucx and bcx - will need special abx requesting from [place]  organism is pan resistant , will need about 2 weeks of therapy for bacteremia as per Carmen.   Awaiting definitive sensitivities from lab for organism to decide on abx therapy..       Hospice consult called, will be seen on Tuesday after the holiday.  Spoke with case management/social work regarding options for hospice to be discussed with family over the weekend.

## 2016-12-25 NOTE — PROGRESS NOTE ADULT - SUBJECTIVE AND OBJECTIVE BOX
NEPHROLOGY INTERVAL HPI/OVERNIGHT EVENTS:    MEDICATIONS  (STANDING):  vancomycin    Solution 250milliGRAM(s) Oral every 6 hours  insulin lispro (HumaLOG) corrective regimen sliding scale  SubCutaneous three times a day before meals  dextrose 5%. 1000milliLiter(s) IV Continuous <Continuous>  dextrose 50% Injectable 12.5Gram(s) IV Push once  dextrose 50% Injectable 25Gram(s) IV Push once  dextrose 50% Injectable 25Gram(s) IV Push once  metoprolol succinate ER 50milliGRAM(s) Oral daily  dextrose 5%. 1000milliLiter(s) IV Continuous <Continuous>  tamsulosin 0.4milliGRAM(s) Oral at bedtime  polymyxin B IVPB [id number](s) IV Intermittent every 12 hours  clotrimazole Lozenge 1Lozenge Oral five times a day  saccharomyces boulardii 250milliGRAM(s) Oral two times a day  calcium gluconate IVPB 1Gram(s) IV Intermittent once  sodium chloride 0.9% 1000milliLiter(s) IV Continuous <Continuous>    MEDICATIONS  (PRN):  dextrose Gel 1Dose(s) Oral once PRN Blood Glucose LESS THAN 70 milliGRAM(s)/deciLiter  glucagon  Injectable 1milliGRAM(s) IntraMuscular once PRN Glucose <70 milliGRAM(s)/deciLiter  acetaminophen   Tablet. 650milliGRAM(s) Oral every 6 hours PRN headache/pain  benzocaine 15 mG/menthol 3.6 mG Lozenge 1Lozenge Oral three times a day PRN Sore Throat      Allergies    No Known Allergies    Intolerances        Vital Signs Last 24 Hrs  T(C): 36, Max: 37.1 (12-24 @ 23:11)  T(F): 96.8, Max: 98.7 (12-24 @ 23:11)  HR: 68 (68 - 71)  BP: 139/66 (129/60 - 150/69)  BP(mean): --  RR: 18 (18 - 18)  SpO2: --  Daily     Daily     PHYSICAL EXAM:    GENERAL: same  HEAD:    EYES:   ENMT:   NECK:   NERVOUS SYSTEM:    CHEST/LUNG: no wheezes noted  HEART:   ABDOMEN: same  EXTREMITIES:    LYMPH:   SKIN:    bladder same  LABS:                        8.2    22.51 )-----------( 224      ( 25 Dec 2016 08:58 )             23.4     25 Dec 2016 08:58    133    |  100    |  16.0   ----------------------------<  130    3.2     |  23.0   |  0.73     Ca    6.7        25 Dec 2016 08:58  Phos  2.3       25 Dec 2016 08:58  Mg     1.6       25 Dec 2016 08:58    TPro  x      /  Alb  1.5    /  TBili  x      /  DBili  x      /  AST  x      /  ALT  x      /  AlkPhos  x      24 Dec 2016 06:28        Magnesium, Serum: 1.6 mg/dL (12-25 @ 08:58)  Phosphorus Level, Serum: 2.3 mg/dL (12-25 @ 08:58)          RADIOLOGY & ADDITIONAL TESTS:

## 2016-12-25 NOTE — PROGRESS NOTE ADULT - SUBJECTIVE AND OBJECTIVE BOX
[facility] [place] [facility] SAI COLLINS MD FACP   JARED NORTON MD  Diplomates American Board of Internal Medicine and Infecctious Diseases      MRN-[id number]  TRAN MCDONALD is a 58y  Female     CC:ADMITTED WITH SEVERE LEUKOCYTOSIS TO 40K    HX STAGE 4 UTERINE CANCER ON CHEMOTHERAPY  PT WITH C DIFF ON VANCO  ALSO WITH MDR ENTEROBACTER  ON POLYMYXIN      Past Medical & Surgical Hx:  PAST MEDICAL & SURGICAL HISTORY:  Uterine sarcoma  Cervical cancer  Uterine cancer  Dyslipidemia  Essential hypertension, hypertension with unspecified goal  Diabetes  S/P bariatric surgery: lap band      Problem List:  HEALTH ISSUES - PROBLEM Dx:  Clostridium difficile colitis: Clostridium difficile colitis  UTI (urinary tract infection): UTI (urinary tract infection)  Urinary retention: Urinary retention  Pulmonary embolism, other: Pulmonary embolism, other  Uterine sarcoma: Uterine sarcoma      ANTIBIOTICS:   vancomycin    Solution 250milliGRAM(s) Oral every 6 hours         Review of Systems: - Negative except as mentioned below.PAIN LLQ AND DIARRHEA      Physical Exam:    Vital Signs Last 24 Hrs  Vital Signs Last 24 Hrs  T(C): 36, Max: 37.2 (12-24 @ 16:14)  T(F): 96.8, Max: 99 (12-24 @ 16:14)  HR: 68 (67 - 80)  BP: 139/66 (129/60 - 150/69)  BP(mean): --  RR: 18 (18 - 20)  SpO2: 93% (93% - 93%)    GEN: NAD, pleasant. ILL APPEARING  HEENT: normocephalic and atraumatic. EOMI. IVÁN. Moist mucosa. Clear Posterior pharynx.  NECK: Supple. No carotid bruits.  No lymphadenopathy or thyromegaly.  LUNGS: Clear to auscultation.  HEART: Regular rate and rhythm without murmur.  ABDOMEN: Soft, nontender, and nondistended.  Positive bowel sounds.  SL DISTENDED. ACTIVE BSS. TENDER NO REBOUND LLQ  EXTREMITIES: Without any cyanosis, clubbing, rash, lesions or edema.  NEUROLOGIC: Cranial nerves II through XII are grossly intact.  MUSCULOSKELETAL:  SKIN: No ulceration or induration present.      Labs:                        CBC Full  -  ( 25 Dec 2016 08:58 )  WBC Count : 22.51 K/uL  Hemoglobin : 8.2 g/dL  Hematocrit : 23.4 %  Platelet Count - Automated : 224 K/uL  Mean Cell Volume : 85.1 fl  Mean Cell Hemoglobin : 29.8 pg  Mean Cell Hemoglobin Concentration : 35.0 g/dL  Auto Neutrophil # : x  Auto Lymphocyte # : x  Auto Monocyte # : x  Auto Eosinophil # : x  Auto Basophil # : x  Auto Neutrophil % : x  Auto Lymphocyte % : x  Auto Monocyte % : x  Auto Eosinophil % : x  Auto Basophil % : x       Urinalysis Basic - ( 14 Dec 2016 03:46 )    Color: Pale Yellow / Appearance: very cloudy / S.010 / pH: x  Gluc: x / Ketone: Negative  / Bili: Negative / Urobili: Negative mg/dL   Blood: x / Protein: 100 mg/dL / Nitrite: Negative   Leuk Esterase: Moderate / RBC: 6-10 /HPF / WBC TNTC /HPF   Sq Epi: x / Non Sq Epi: Occasional / Bacteria: Occasional        Hemoglobin: 7.6 g/dL ( @ 19:38)  WBC Count: 39.48 K/uL ( @ 19:38)  Hematocrit: 21.3 % ( @ 19:38)  Platelet Count - Automated: 98 K/uL ( @ 19:38)    Blood Urea Nitrogen, Serum: 59.0 mg/dL <H> ( @ 19:38)  Sodium, Serum: 135 mmol/L ( @ 19:38)  Potassium, Serum: 3.8 mmol/L ( @ 19:38)          MICROBIOLOGY  Clostridium difficile Toxin by PCR (16 @ 21:28)    C Diff by PCR Result: Detected    Clostridium difficile Toxin by PCR: RESULT INTERPRETATION:    Detected - Clostridium difficile toxin B detected by amplified DNA PCR .        RADIOLOGY  No significant retroperitoneal lymphadenopathy. Mildly enlarged left   para-aortic lymph nodes, unchanged. No definite pelvic adenopathy. A   Manning catheter is noted in the urinary bladder. There is diffuse   thickening of the bladder wall. There is enlargement of the uterus. There   is now diffuse thickening of the rectal wall which is a new finding since   the prior examination. In addition there is diffuse thickening of the   wall of the sigmoid, descending colon, splenic flexure. These findings   are new.

## 2016-12-25 NOTE — PROGRESS NOTE ADULT - SUBJECTIVE AND OBJECTIVE BOX
GYNECOLOGIC ONCOLOGY PROGRESS NOTE    PROBLEMS:    Diabetes  Anemia due to blood loss  Malignant neoplasm of cervix, unspecified site  Hematuria, gross  Sepsis, due to unspecified organism  Clostridium difficile colitis  UTI (urinary tract infection)  Urinary retention  Pulmonary embolism, other  Uterine sarcoma      Pt seen and examined at bedside. Patient is without complaints.  Reports last diarrhea was yesterday morning.   Continues IV ABx for enterobacter cloace  Pain well-controlled.  Denies Nausea, Vomiting.   Denies shortness of breath, chest pain or dyspnea on exertion.  Tolerating diet.  Continues bladder irrigation, now urine is clear, if continues to be clear till PM will stop bladder irrigation  Planned for hospice   on exam:  Abdomen soft, not tender, BS +  Extremities - no calf tenderness or pain  Lungs - CTA      OBJECTIVE:     VITALS:  T(F): 96.8, Max: 99 (12-24 @ 16:14)  HR: 68 (67 - 80)  BP: 139/66 (129/60 - 150/69)  RR: 18 (18 - 20)  SpO2: 93% (93% - 93%)      MEDICATIONS  (STANDING):  vancomycin    Solution 250milliGRAM(s) Oral every 6 hours  insulin lispro (HumaLOG) corrective regimen sliding scale  SubCutaneous three times a day before meals  dextrose 5%. 1000milliLiter(s) IV Continuous <Continuous>  dextrose 50% Injectable 12.5Gram(s) IV Push once  dextrose 50% Injectable 25Gram(s) IV Push once  dextrose 50% Injectable 25Gram(s) IV Push once  metoprolol succinate ER 50milliGRAM(s) Oral daily  dextrose 5%. 1000milliLiter(s) IV Continuous <Continuous>  tamsulosin 0.4milliGRAM(s) Oral at bedtime  sodium chloride 0.225% 1000milliLiter(s) IV Continuous <Continuous>  polymyxin B IVPB [id number](s) IV Intermittent every 12 hours  clotrimazole Lozenge 1Lozenge Oral five times a day  saccharomyces boulardii 250milliGRAM(s) Oral two times a day    MEDICATIONS  (PRN):  dextrose Gel 1Dose(s) Oral once PRN Blood Glucose LESS THAN 70 milliGRAM(s)/deciLiter  glucagon  Injectable 1milliGRAM(s) IntraMuscular once PRN Glucose <70 milliGRAM(s)/deciLiter  acetaminophen   Tablet. 650milliGRAM(s) Oral every 6 hours PRN headache/pain  benzocaine 15 mG/menthol 3.6 mG Lozenge 1Lozenge Oral three times a day PRN Sore Throat      Physical Exam:  Constitutional: NAD  Pulmonary: clear to auscultation bilaterally   Cardiovascular: Regular rate and rhythm   Abdomen: soft, non-tender, non-distended, normal bowel signs  Extremities: no lower extremity edema or calve tenderness, Samir's sign negative.  Incision: Clean, dry, intact.  Without signs of infection or hernia.      LABS:                        8.2    22.51 )-----------( 224      ( 25 Dec 2016 08:58 )             23.4     25 Dec 2016 08:58    133    |  100    |  16.0   ----------------------------<  130    3.2     |  23.0   |  0.73     Ca    6.7        25 Dec 2016 08:58  Phos  2.3       25 Dec 2016 08:58  Mg     1.6       25 Dec 2016 08:58    TPro  x      /  Alb  1.5    /  TBili  x      /  DBili  x      /  AST  x      /  ALT  x      /  AlkPhos  x      24 Dec 2016 06:28          RADIOLOGY & ADDITIONAL TESTS:

## 2016-12-25 NOTE — PROGRESS NOTE ADULT - SUBJECTIVE AND OBJECTIVE BOX
PAST MEDICAL & SURGICAL HISTORY:  Uterine sarcoma  Cervical cancer  Uterine cancer  Dyslipidemia  Essential hypertension, hypertension with unspecified goal  Diabetes  S/P bariatric surgery: lap band      ANTIMICROBIAL:  vancomycin    Solution 250milliGRAM(s) Oral every 6 hours  polymyxin B IVPB [id number](s) IV Intermittent every 12 hours  clotrimazole Lozenge 1Lozenge Oral five times a day    CARDIOVASCULAR:  metoprolol succinate ER 50milliGRAM(s) Oral daily  tamsulosin 0.4milliGRAM(s) Oral at bedtime    PULMONARY:    NEUROLOGIC:  acetaminophen   Tablet. 650milliGRAM(s) Oral every 6 hours PRN      ENDO/METABOLIC:  insulin lispro (HumaLOG) corrective regimen sliding scale  SubCutaneous three times a day before meals  dextrose Gel 1Dose(s) Oral once PRN  dextrose 50% Injectable 12.5Gram(s) IV Push once  dextrose 50% Injectable 25Gram(s) IV Push once  dextrose 50% Injectable 25Gram(s) IV Push once  glucagon  Injectable 1milliGRAM(s) IntraMuscular once PRN    IV FLUID/NUTRITION:  dextrose 5%. 1000milliLiter(s) IV Continuous <Continuous>  dextrose 5%. 1000milliLiter(s) IV Continuous <Continuous>  sodium chloride 0.225% 1000milliLiter(s) IV Continuous <Continuous>    TOPICAL:  benzocaine 15 mG/menthol 3.6 mG Lozenge 1Lozenge Oral three times a day PRN    IMMUNOLOGIC & OTHER  saccharomyces boulardii 250milliGRAM(s) Oral two times a day        Allergies    No Known Allergies      FAMILY HISTORY:  Family history of coronary artery disease      Vital Signs Last 24 Hrs  T(C): 36, Max: 37.2 (12-24 @ 16:14)  T(F): 96.8, Max: 99 (12-24 @ 16:14)  HR: 68 (67 - 80)  BP: 139/66 (129/60 - 150/69)  BP(mean): --  RR: 18 (18 - 20)  SpO2: 93% (93% - 93%)    ROS:  all negative except mild LLQ pain    PHYSICAL EXAM:    GENERAL: NAD, well-groomed, well-developed  HEAD:  Atraumatic, Normocephalic  EYES: EOMI, PERRLA, conjunctiva and sclera clear  ENMT: No tonsillar erythema, exudates, or enlargement; Moist mucous membranes, Good dentition, No lesions  NECK: Supple, No JVD, Normal thyroid  NERVOUS SYSTEM:  Alert & Oriented X3,   CHEST/LUNG: Clear to percussion bilaterally; No rales, rhonchi, wheezing, or rubs  HEART: Regular rate and rhythm; No murmurs, rubs, or gallops  ABDOMEN: Soft, LLQ discomfort   EXTREMITIES:  mild edema        LABS:                        8.2    22.51 )-----------( 224      ( 25 Dec 2016 08:58 )             23.4     25 Dec 2016 08:58    133    |  100    |  16.0   ----------------------------<  130    3.2     |  23.0   |  0.73     Ca    6.7        25 Dec 2016 08:58  Phos  2.3       25 Dec 2016 08:58  Mg     1.6       25 Dec 2016 08:58    TPro  x      /  Alb  1.5    /  TBili  x      /  DBili  x      /  AST  x      /  ALT  x      /  AlkPhos  x      24 Dec 2016 06:28      blood cul noted     Radiology: CT abd noted

## 2016-12-26 NOTE — PROGRESS NOTE ADULT - ASSESSMENT
58F with above issues.  On hospice, DNR/DNR.  No acute concerns.  1. DC CBI, continue saul  2. Continue vanco taper  3. Continue Polymixin per ID  4. Planning for transfer to nursing home.

## 2016-12-26 NOTE — CONSULT NOTE ADULT - CONSULT REASON
CADEN .
Anemia   Suprapubic pain
LEUKOCYTOSIS AND DIARRHEA
Medical
Preoperative cardiovascular Examination
Recurrent urinary retention and new onset hematuria
Urinary retention
goals of care, end stage cancer

## 2016-12-26 NOTE — CONSULT NOTE ADULT - PROBLEM SELECTOR PROBLEM 1
Clostridium difficile colitis
Urinary retention
Urinary tract infection without hematuria, site unspecified
Uterine sarcoma
Uterine sarcoma

## 2016-12-26 NOTE — CONSULT NOTE ADULT - PROBLEM SELECTOR RECOMMENDATION 9
1. Maintain saul at this time  2. Continue to monitor output at least 30cc/hr
F/U labs  F/U FeNa  F/U blood and urine cultures  PRBCX2  IV Invanz  IVH  Regular diet  Ambulate with assistance  Medicine follow up
abx    fu uc
-no longer a candidate for chemotherapy

## 2016-12-26 NOTE — PROGRESS NOTE ADULT - SUBJECTIVE AND OBJECTIVE BOX
Hospital Day #  POD # 3 s/p cystoscopy with bladder bx. and fulguration, TURB  IV: SL  diet: dash/dm  patient : afebrile, awake and alert resting comfortable in bed in no acute distress.  T(C): 37.6, Max: 37.6 (12-26 @ 08:31)  HR: 71 (71 - 80)  BP: 123/57 (123/57 - 141/68)  RR: 18 (16 - 18)  SpO2: --  Wt(kg): --  chest:  Abdomen:  output:  saul catheter in place adequate output no blood noted urine yellowish            CBI has been clamped since 12 25  Extremities:                          8.5    23.17 )-----------( 284      ( 26 Dec 2016 07:05 )             24.8       26 Dec 2016 07:05    132    |  98     |  12.0   ----------------------------<  90     3.4     |  23.0   |  0.60     Ca    6.8        26 Dec 2016 07:05  Phos  2.8       26 Dec 2016 07:05  Mg     1.8       26 Dec 2016 07:05            xrays:    PAST MEDICAL & SURGICAL HISTORY:  Uterine sarcoma  Cervical cancer  Uterine cancer  Dyslipidemia  Essential hypertension, hypertension with unspecified goal  Diabetes  S/P bariatric surgery: lap band    discus with Dr. Paez continued care and management  Impression: stable s/p cysto TURB, bx. and fulguration                    malignant bladder neoplasm- metastatic                     CBI clamped since 12/25 by GYN                     stage iv uterine cancer on chemo                     + c. diff. toxin                     sepsis                                 Plan: continue present care and management as per medicine, GYN Hospital Day #  POD # 3 s/p cystoscopy with bladder bx. and fulguration, TURB  IV: SL  diet: dash/dm  patient : afebrile, awake and alert resting comfortable in bed in no acute distress.  T(C): 37.6, Max: 37.6 (12-26 @ 08:31)  HR: 71 (71 - 80)  BP: 123/57 (123/57 - 141/68)  RR: 18 (16 - 18)  SpO2: --  Wt(kg): --  chest:  Abdomen:  output:  saul catheter in place adequate output no blood noted urine yellowish            CBI has been clamped since 12 25  Extremities:                          8.5    23.17 )-----------( 284      ( 26 Dec 2016 07:05 )             24.8       26 Dec 2016 07:05    132    |  98     |  12.0   ----------------------------<  90     3.4     |  23.0   |  0.60     Ca    6.8        26 Dec 2016 07:05  Phos  2.8       26 Dec 2016 07:05  Mg     1.8       26 Dec 2016 07:05            xrays:    PAST MEDICAL & SURGICAL HISTORY:  Uterine sarcoma  Cervical cancer  Uterine cancer  Dyslipidemia  Essential hypertension, hypertension with unspecified goal  Diabetes  S/P bariatric surgery: lap band    discus with Dr. Paez continued care and management  Impression: stable s/p cysto TURB, bx. and fulguration                    malignant bladder neoplasm- metastatic                     CBI clamped since 12/25 by GYN                     stage iv uterine cancer on chemo                     + c. diff. toxin                     sepsis                                 Plan: continue present care and management as per ID, medicine, GYN

## 2016-12-26 NOTE — CHART NOTE - NSCHARTNOTEFT_GEN_A_CORE
Upon Nutritional Assessment by the Registered Dietitian your patient was determined to meet criteria / has evidence of the following diagnosis/diagnoses:          [ ]  Mild Protein Calorie Malnutrition        [ ]  Moderate Protein Calorie Malnutrition        [ x] Severe Protein Calorie Malnutrition        [ ] Unspecified Protein Calorie Malnutrition        [ ] Underweight / BMI <19        [ ] Morbid Obesity / BMI > 40      Findings as based on:  •  Comprehensive nutrition assessment and consultation  •  Calorie counts (nutrient intake analysis)  •  Food acceptance and intake status from observations by staff  •  Follow up  •  Patient education  •  Intervention secondary to interdisciplinary rounds  •   concerns      Treatment:    The following diet has been recommended:    Change Glucerna supplements to Ensure Clear TID   PROVIDER Section:     By signing this assessment you are acknowledging and agree with the diagnosis/diagnoses assigned by the Registered Dietitian    Comments:            s

## 2016-12-26 NOTE — CONSULT NOTE ADULT - PROBLEM SELECTOR RECOMMENDATION 4
-psychosocial and spiritual support provided to patient with my  Gabriela. She is a very strong woman and is sad but realizes that unfortunately this is the nature of her illness.   -She is unsure of longer term plan - she knows she needs hospice, ? home with mom and hospice? She does have four children but two are out of state.

## 2016-12-26 NOTE — CONSULT NOTE ADULT - ASSESSMENT
58F with uterine sarcoma, possible erosion into bladder mucosa causing hematuria.
ACUTE C DIFFICILE COLITIS WITH MILD-MOD LOCALIZED COLITIS ON CT AND POS CDIFF PCR    PLAN  CONT PO VANCO  BECAUSE OF SIGNIF LEUKOCYTOSIS AND POOR PO WILL ADD IV FLAGYL UNTIL STABLE  D/W PT
CADEN - Metastatic Uterine Sarcoma, C. Dif colitis with stool losses ,   B/L [place] on CT with h/o retention  Manning out at present   Continue IV Flagyl and enteral vanco  Gyn-Onc follow up noted   Add Bicarbonate to IVF with KCL  Bladder scan post void to assess residual   Trend labs   Currently her MDRD GFR is 15 ml/min , so current dose of Lovenox is reasonable ( h/o PE )
Retention/ UTI
Uterine sarcoma  Anemia  Suprapubic pain  Indwelling saul catheter
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 Patient with MDR Enterobacter bacteremia and UTI, also Cdiff.

## 2016-12-26 NOTE — PROGRESS NOTE ADULT - SUBJECTIVE AND OBJECTIVE BOX
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  Patient doing well this morning. She is tolerating a diet and denies n/v/d and abdominal/suprapubic pain.     HEALTH ISSUES - PROBLEM Dx:  Diabetes: Diabetes  Gram negative sepsis: Gram negative sepsis  Drop in hemoglobin: Drop in hemoglobin  Anemia due to blood loss: Anemia due to blood loss  Malignant neoplasm of cervix, unspecified site: Malignant neoplasm of cervix, unspecified site  Hematuria, gross: Hematuria, gross  Urinary tract infection without hematuria, site unspecified: Urinary tract infection without hematuria, site unspecified  Sepsis, due to unspecified organism: Sepsis, due to unspecified organism  Clostridium difficile colitis: Clostridium difficile colitis  UTI (urinary tract infection): UTI (urinary tract infection)  Urinary retention: Urinary retention  Pulmonary embolism, other: Pulmonary embolism, other  Uterine sarcoma: Uterine sarcoma      MEDICATIONS  (STANDING):  vancomycin    Solution 250milliGRAM(s) Oral every 6 hours  insulin lispro (HumaLOG) corrective regimen sliding scale  SubCutaneous three times a day before meals  dextrose 5%. 1000milliLiter(s) IV Continuous <Continuous>  dextrose 50% Injectable 12.5Gram(s) IV Push once  dextrose 50% Injectable 25Gram(s) IV Push once  dextrose 50% Injectable 25Gram(s) IV Push once  metoprolol succinate ER 50milliGRAM(s) Oral daily  dextrose 5%. 1000milliLiter(s) IV Continuous <Continuous>  tamsulosin 0.4milliGRAM(s) Oral at bedtime  polymyxin B IVPB [id number](s) IV Intermittent every 12 hours  clotrimazole Lozenge 1Lozenge Oral five times a day  saccharomyces boulardii 250milliGRAM(s) Oral two times a day  sodium chloride 0.9% 1000milliLiter(s) IV Continuous <Continuous>  potassium chloride  10 mEq/100 mL IVPB 10milliEquivalent(s) IV Intermittent every 1 hour  magnesium sulfate  IVPB 2Gram(s) IV Intermittent once    Vital Signs Last 24 Hrs  T(C): 36.2, Max: 36.2 (12-25 @ 16:54)  T(F): 97.2, Max: 97.2 (12-25 @ 16:54)  HR: 80 (72 - 80)  BP: 136/60 (134/68 - 141/68)  BP(mean): --  RR: 16 (16 - 18)  SpO2: --                          8.5    23.17 )-----------( 284      ( 26 Dec 2016 07:05 )             24.8   26 Dec 2016 07:05    132    |  98     |  12.0   ----------------------------<  90     3.4     |  23.0   |  0.60     Ca    6.8        26 Dec 2016 07:05  Phos  2.8       26 Dec 2016 07:05  Mg     1.8       26 Dec 2016 07:05    Physical exam:  Abdomen soft, NTTP, ND, +BS  Renal: Urine output adequate at 1000cc/24h.  Tea colored with CBI clamped.   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 Diff colitis.  Patient doing well this morning. She is tolerating a diet and denies n/v/d and abdominal/suprapubic pain.     HEALTH ISSUES - PROBLEM Dx:  Diabetes: Diabetes  Gram negative sepsis: Gram negative sepsis  Drop in hemoglobin: Drop in hemoglobin  Anemia due to blood loss: Anemia due to blood loss  Malignant neoplasm of cervix, unspecified site: Malignant neoplasm of cervix, unspecified site  Hematuria, gross: Hematuria, gross  Urinary tract infection without hematuria, site unspecified: Urinary tract infection without hematuria, site unspecified  Sepsis, due to unspecified organism: Sepsis, due to unspecified organism  Clostridium difficile colitis: Clostridium difficile colitis  UTI (urinary tract infection): UTI (urinary tract infection)  Urinary retention: Urinary retention  Pulmonary embolism, other: Pulmonary embolism, other  Uterine sarcoma: Uterine sarcoma      MEDICATIONS  (STANDING):  vancomycin    Solution 250milliGRAM(s) Oral every 6 hours  insulin lispro (HumaLOG) corrective regimen sliding scale  SubCutaneous three times a day before meals  dextrose 5%. 1000milliLiter(s) IV Continuous <Continuous>  dextrose 50% Injectable 12.5Gram(s) IV Push once  dextrose 50% Injectable 25Gram(s) IV Push once  dextrose 50% Injectable 25Gram(s) IV Push once  metoprolol succinate ER 50milliGRAM(s) Oral daily  dextrose 5%. 1000milliLiter(s) IV Continuous <Continuous>  tamsulosin 0.4milliGRAM(s) Oral at bedtime  polymyxin B IVPB [id number](s) IV Intermittent every 12 hours  clotrimazole Lozenge 1Lozenge Oral five times a day  saccharomyces boulardii 250milliGRAM(s) Oral two times a day  sodium chloride 0.9% 1000milliLiter(s) IV Continuous <Continuous>  potassium chloride  10 mEq/100 mL IVPB 10milliEquivalent(s) IV Intermittent every 1 hour  magnesium sulfate  IVPB 2Gram(s) IV Intermittent once    Vital Signs Last 24 Hrs  T(C): 36.2, Max: 36.2 (12-25 @ 16:54)  T(F): 97.2, Max: 97.2 (12-25 @ 16:54)  HR: 80 (72 - 80)  BP: 136/60 (134/68 - 141/68)  BP(mean): --  RR: 16 (16 - 18)  SpO2: --                          8.5    23.17 )-----------( 284      ( 26 Dec 2016 07:05 )             24.8   26 Dec 2016 07:05    132    |  98     |  12.0   ----------------------------<  90     3.4     |  23.0   |  0.60     Ca    6.8        26 Dec 2016 07:05  Phos  2.8       26 Dec 2016 07:05  Mg     1.8       26 Dec 2016 07:05    Physical exam:  Abdomen soft, NTTP, ND, +BS  Renal: Urine output adequate at 1000cc/24h.  Tea colored with CBI clamped.   Lungs: CTABL, no respiratory distress  CV: RRR

## 2016-12-26 NOTE — CONSULT NOTE ADULT - PROBLEM SELECTOR PROBLEM 2
Hematuria, gross
Pulmonary embolism, other
Urinary retention
Uterine sarcoma
Functional quadriplegia

## 2016-12-26 NOTE — PROGRESS NOTE ADULT - SUBJECTIVE AND OBJECTIVE BOX
[facility] [place] SAI COLLINS MD FACP   JARED NORTON MD  Diplomates American Board of Internal Medicine and Infecctious Diseases      MRN-[id number]  TRAN MCDONALD is a 58y  Female     CC:ADMITTED WITH SEVERE LEUKOCYTOSIS TO 40K    HX STAGE 4 UTERINE CANCER ON CHEMOTHERAPY  PT WITH C DIFF ON VANCO  ALSO WITH MDR ENTEROBACTER  ON POLYMYXIN      Past Medical & Surgical Hx:  PAST MEDICAL & SURGICAL HISTORY:  Uterine sarcoma  Cervical cancer  Uterine cancer  Dyslipidemia  Essential hypertension, hypertension with unspecified goal  Diabetes  S/P bariatric surgery: lap band      Problem List:  HEALTH ISSUES - PROBLEM Dx:  Clostridium difficile colitis: Clostridium difficile colitis  UTI (urinary tract infection): UTI (urinary tract infection)  Urinary retention: Urinary retention  Pulmonary embolism, other: Pulmonary embolism, other  Uterine sarcoma: Uterine sarcoma      ANTIBIOTICS:   vancomycin    Solution 250milliGRAM(s) Oral every 6 hours         Review of Systems: - Negative except as mentioned below.PAIN LLQ AND DIARRHEA      Physical Exam:    VVital Signs Last 24 Hrs  T(C): 37.6, Max: 37.6 ( @ 08:31)  T(F): 99.6, Max: 99.6 ( @ 08:31)  HR: 71 (71 - 80)  BP: 123/57 (123/57 - 141/68)  BP(mean): --  RR: 18 (16 - 18)  SpO2: --    GEN: NAD, pleasant. ILL APPEARING  HEENT: normocephalic and atraumatic. EOMI. IVÁN. Moist mucosa. Clear Posterior pharynx.  NECK: Supple. No carotid bruits.  No lymphadenopathy or thyromegaly.  LUNGS: Clear to auscultation.  HEART: Regular rate and rhythm without murmur.  ABDOMEN: Soft, nontender, and nondistended.  Positive bowel sounds.  SL DISTENDED. ACTIVE BSS. TENDER NO REBOUND LLQ  EXTREMITIES: Without any cyanosis, clubbing, rash, lesions or edema.  NEUROLOGIC: Cranial nerves II through XII are grossly intact.  MUSCULOSKELETAL:  SKIN: No ulceration or induration present.      Labs:                                            8.5    23.17 )-----------( 284      ( 26 Dec 2016 07:05 )             24.8        Urinalysis Basic - ( 14 Dec 2016 03:46 )    Color: Pale Yellow / Appearance: very cloudy / S.010 / pH: x  Gluc: x / Ketone: Negative  / Bili: Negative / Urobili: Negative mg/dL   Blood: x / Protein: 100 mg/dL / Nitrite: Negative   Leuk Esterase: Moderate / RBC: 6-10 /HPF / WBC TNTC /HPF   Sq Epi: x / Non Sq Epi: Occasional / Bacteria: Occasional              MICROBIOLOGY  Clostridium difficile Toxin by PCR (16 @ 21:28)    C Diff by PCR Result: Detected    Clostridium difficile Toxin by PCR: RESULT INTERPRETATION:    Detected - Clostridium difficile toxin B detected by amplified DNA PCR .        RADIOLOGY  No significant retroperitoneal lymphadenopathy. Mildly enlarged left   para-aortic lymph nodes, unchanged. No definite pelvic adenopathy. A   Manning catheter is noted in the urinary bladder. There is diffuse   thickening of the bladder wall. There is enlargement of the uterus. There   is now diffuse thickening of the rectal wall which is a new finding since   the prior examination. In addition there is diffuse thickening of the   wall of the sigmoid, descending colon, splenic flexure. These findings   are new.

## 2016-12-26 NOTE — CONSULT NOTE ADULT - PROBLEM SELECTOR RECOMMENDATION 2
1. Hand-irrigate as needed if clots present  2. May require CBI if saul becomes obstructed  3. Will need cystoscopy  4. Dr. Paez to discuss with patient this evening.   5. Will continue to monitor.
continue saul    start flomax    Please call Dr. Paez On Monday for further recommendations as she was the initial Urologist to evaluate Mrs. Leong
-assist with all ADLs.   -patient lived home alone but recently has been staying with her mom because she needs a lot of assistance.   -she gets around with walker and wheelchair mostly.

## 2016-12-26 NOTE — CONSULT NOTE ADULT - PROBLEM/RECOMMENDATION-2
DISPLAY PLAN FREE TEXT

## 2016-12-26 NOTE — PROGRESS NOTE ADULT - ASSESSMENT
PT IS 57yo FEMALE WITH UTERINE SARCOMA WITH C DIFF ON AND VANCO  BUT NOW WITH  MDR ENTEROBACTER  RESISTANT TO MERREM   CHANGED  TO POLYMYXIN IV  RENAL FXN STABLE  AWAITING THE RESULTS OF SEND OUT URINE CX FOR TESTING TO [facility]   PT ON  VANCO HAVE FOR C DIFF   PT AFEBRILE  FEELS BETTER   PT WITH POSSIBLE HOSPICE  WILL FOLLOW U

## 2016-12-26 NOTE — CONSULT NOTE ADULT - CONSULT REQUESTED DATE/TIME
13-Dec-2016 18:02
13-Dec-2016 20:31
14-Dec-2016 18:10
17-Dec-2016
21-Dec-2016
22-Dec-2016 08:43
26-Dec-2016 12:05
16-Dec-2016

## 2016-12-26 NOTE — CONSULT NOTE ADULT - ATTENDING COMMENTS
More than 50% time spent in counseling and coordinating care. 75 Minutes.     Thank you for the opportunity to assist with the care of this patient.   [facility] [phone].

## 2016-12-27 NOTE — PROGRESS NOTE ADULT - SUBJECTIVE AND OBJECTIVE BOX
GYNECOLOGIC ONCOLOGY PROGRESS NOTE      PROBLEMS:    Clostridium difficile colitis  UTI (urinary tract infection)  Urinary retention  Pulmonary embolism, other  Uterine sarcoma      Pt seen and examined at bedside. Denies any diarrhea for the past 3 days. normal BM now.   Patient reports minimal abdominal discomfort. Pain well-controlled.  No bleeding from bladder, urine is clear.   IV ABx for bacteremia with resistant enterobacter  Continues PO vancomycin for clostridium diff colitis  Flatus: +  Denies Nausea, Vomiting.  Denies shortness of breath, chest pain.  Tolerating diet.  Patient is planned for hospice.     OBJECTIVE:     VITALS:  T(F): 99.6, Max: 99.6 (12-26 @ 08:31)  HR: 71 (71 - 73)  BP: 123/57 (123/57 - 130/68)  RR: 18 (16 - 18)        MEDICATIONS  (STANDING):  vancomycin    Solution 250milliGRAM(s) Oral every 6 hours  insulin lispro (HumaLOG) corrective regimen sliding scale  SubCutaneous three times a day before meals  dextrose 5%. 1000milliLiter(s) IV Continuous <Continuous>  dextrose 50% Injectable 12.5Gram(s) IV Push once  dextrose 50% Injectable 25Gram(s) IV Push once  dextrose 50% Injectable 25Gram(s) IV Push once  metoprolol succinate ER 50milliGRAM(s) Oral daily  dextrose 5%. 1000milliLiter(s) IV Continuous <Continuous>  tamsulosin 0.4milliGRAM(s) Oral at bedtime  polymyxin B IVPB [id number](s) IV Intermittent every 12 hours  clotrimazole Lozenge 1Lozenge Oral five times a day  saccharomyces boulardii 250milliGRAM(s) Oral two times a day  sodium chloride 0.9% 1000milliLiter(s) IV Continuous <Continuous>        Physical Exam:  Constitutional: NAD  Abdomen: soft, non-tender, non-distended  Extremities: no lower extremity edema or calve tenderness, Samir's sign negative.        LABS:                        8.5    23.17 )-----------( 284      ( 26 Dec 2016 07:05 )             24.8     26 Dec 2016 07:05    132    |  98     |  12.0   ----------------------------<  90     3.4     |  23.0   |  0.60     Ca    6.8        26 Dec 2016 07:05  Phos  2.8       26 Dec 2016 07:05  Mg     1.8       26 Dec 2016 07:05            RADIOLOGY & ADDITIONAL TESTS:

## 2016-12-27 NOTE — PROGRESS NOTE ADULT - SUBJECTIVE AND OBJECTIVE BOX
Patient seen and evaluated  Problems:  1. Hb 7.9, will get 2PRBC  2. Hx of PE, will restart lovenox, as bleeding from bladder stopped  3. Urine clear without need for CBI  4. D.Ciff colitis - no diarrhea for 3 days  5. BCx and Ucx positive for MDR Enterobacter and CRE E.coli respectively.  6. Planned for hospice, seen by them and will plan for home hospice once final ABx determined per infection

## 2016-12-27 NOTE — PROGRESS NOTE ADULT - SUBJECTIVE AND OBJECTIVE BOX
[facility] [place] SAI COLLINS MD FACP   JARED NORTON MD  Diplomates American Board of Internal Medicine and Infecctious Diseases      MRN-[id number]  TRAN MCDONALD is a 58y  Female     CC:ADMITTED WITH SEVERE LEUKOCYTOSIS TO 40K    HX STAGE 4 UTERINE CANCER ON CHEMOTHERAPY  PT WITH C DIFF ON VANCO  ALSO WITH MDR ENTEROBACTER  ON POLYMYXIN  NL CREAT      Past Medical & Surgical Hx:  PAST MEDICAL & SURGICAL HISTORY:  Uterine sarcoma  Cervical cancer  Uterine cancer  Dyslipidemia  Essential hypertension, hypertension with unspecified goal  Diabetes  S/P bariatric surgery: lap band      Problem List:  HEALTH ISSUES - PROBLEM Dx:  Clostridium difficile colitis: Clostridium difficile colitis  UTI (urinary tract infection): UTI (urinary tract infection)  Urinary retention: Urinary retention  Pulmonary embolism, other: Pulmonary embolism, other  Uterine sarcoma: Uterine sarcoma      ANTIBIOTICS:   vancomycin    Solution 250milliGRAM(s) Oral every 6 hours         Review of Systems: - Negative except as mentioned below.PAIN LLQ AND DIARRHEA      Physical Exam:    VVital Signs Last 24 Hrs  T(C): 37.6, Max: 37.6 ( @ 08:31)  T(F): 99.6, Max: 99.6 (- @ 08:31)  HR: 71 (71 - 80)  BP: 123/57 (123/57 - 141/68)  BP(mean): --  RR: 18 (16 - 18)  SpO2: --    GEN: NAD, pleasant. ILL APPEARING  HEENT: normocephalic and atraumatic. EOMI. IVÁN. Moist mucosa. Clear Posterior pharynx.  NECK: Supple. No carotid bruits.  No lymphadenopathy or thyromegaly.  LUNGS: Clear to auscultation.  HEART: Regular rate and rhythm without murmur.  ABDOMEN: Soft, nontender, and nondistended.  Positive bowel sounds.  SL DISTENDED. ACTIVE BSS. TENDER NO REBOUND LLQ  EXTREMITIES: Without any cyanosis, clubbing, rash, lesions or edema.  NEUROLOGIC: Cranial nerves II through XII are grossly intact.  MUSCULOSKELETAL:  SKIN: No ulceration or induration present.      Labs:                                            8.5    23.17 )-----------( 284      ( 26 Dec 2016 07:05 )             24.8        Urinalysis Basic - ( 14 Dec 2016 03:46 )    Color: Pale Yellow / Appearance: very cloudy / S.010 / pH: x  Gluc: x / Ketone: Negative  / Bili: Negative / Urobili: Negative mg/dL   Blood: x / Protein: 100 mg/dL / Nitrite: Negative   Leuk Esterase: Moderate / RBC: 6-10 /HPF / WBC TNTC /HPF   Sq Epi: x / Non Sq Epi: Occasional / Bacteria: Occasional              MICROBIOLOGY  Clostridium difficile Toxin by PCR (16 @ 21:28)    C Diff by PCR Result: Detected    Clostridium difficile Toxin by PCR: RESULT INTERPRETATION:    Detected - Clostridium difficile toxin B detected by amplified DNA PCR .        RADIOLOGY  No significant retroperitoneal lymphadenopathy. Mildly enlarged left   para-aortic lymph nodes, unchanged. No definite pelvic adenopathy. A   Manning catheter is noted in the urinary bladder. There is diffuse   thickening of the bladder wall. There is enlargement of the uterus. There   is now diffuse thickening of the rectal wall which is a new finding since   the prior examination. In addition there is diffuse thickening of the   wall of the sigmoid, descending colon, splenic flexure. These findings   are new.

## 2016-12-27 NOTE — PROGRESS NOTE ADULT - ASSESSMENT
PT IS 59yo FEMALE WITH UTERINE SARCOMA WITH C DIFF ON AND VANCO  BUT NOW WITH  MDR ENTEROBACTER  CONT POLYMYXIN  RENAL FXN STABLE  AWAITING THE RESULTS OF SEND OUT URINE CX FOR TESTING TO [facility]   PT ON  VANCO HAVE FOR C DIFF   PT AFEBRILE  FEELS BETTER   PT WITH POSSIBLE HOSPICE  WILL FOLLOW U

## 2016-12-27 NOTE — PROGRESS NOTE ADULT - ASSESSMENT
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 Patient with MDR Enterobacter bacteremia and UTI, also Cdiff.

## 2016-12-27 NOTE — PROGRESS NOTE ADULT - SUBJECTIVE AND OBJECTIVE BOX
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 650milliGRAM(s) Oral every 6 hours PRN    ONCOLOGIC:    HEMATOLOGIC:    GATROINTESTINAL:     MEDS:    ENDO/METABOLIC:  insulin lispro (HumaLOG) corrective regimen sliding scale  SubCutaneous three times a day before meals  dextrose Gel 1Dose(s) Oral once PRN  dextrose 50% Injectable 12.5Gram(s) IV Push once  dextrose 50% Injectable 25Gram(s) IV Push once  dextrose 50% Injectable 25Gram(s) IV Push once  glucagon  Injectable 1milliGRAM(s) IntraMuscular once PRN    IV FLUID/NUTRITION:  dextrose 5%. 1000milliLiter(s) IV Continuous <Continuous>  dextrose 5%. 1000milliLiter(s) IV Continuous <Continuous>  sodium chloride 0.9% 1000milliLiter(s) IV Continuous <Continuous>    TOPICAL:  benzocaine 15 mG/menthol 3.6 mG Lozenge 1Lozenge Oral three times a day PRN    IMMUNOLOGIC & OTHER  saccharomyces boulardii 250milliGRAM(s) Oral two times a day        Allergies    No Known Allergies    Intolerances        SOCIAL HISTORY:    FAMILY HISTORY:  Family history of coronary artery disease      Vital Signs Last 24 Hrs  T(C): 37.2, Max: 37.2 (12-27 @ 08:05)  T(F): 99, Max: 99 (12-27 @ 08:05)  HR: 81 (81 - 81)  BP: 144/72 (144/72 - 144/72)  BP(mean): --  RR: 18 (18 - 18)  SpO2: --      REVIEW OF SYSTEMS:    CONSTITUTIONAL: No fever, weight loss, or fatigue  EYES: No eye pain, visual disturbances, or discharge  ENMT:  No difficulty hearing, tinnitus, vertigo; No sinus or throat pain  NECK: No pain or stiffness  RESPIRATORY: No cough, wheezing, chills or hemoptysis; No shortness of breath  CARDIOVASCULAR: No chest pain, palpitations, dizziness, or leg swelling  GASTROINTESTINAL: +lower abd pain   NEUROLOGICAL: No headaches, memory loss, loss of strength, numbness, or tremors  ENDOCRINE: No heat or cold intolerance; No hair loss  MUSCULOSKELETAL: No joint pain or swelling; No muscle, back, or extremity pain      PHYSICAL EXAM:    GENERAL: NAD, well-groomed, well-developed  HEAD:  Atraumatic, Normocephalic  EYES: EOMI, PERRLA, conjunctiva and sclera clear  ENMT: No tonsillar erythema, exudates, or enlargement; Moist mucous membranes, Good dentition, No lesions  NECK: Supple, No JVD, Normal thyroid  NERVOUS SYSTEM:  Alert & Oriented X3,   CHEST/LUNG: Clear to percussion bilaterally; No rales, rhonchi, wheezing, or rubs  HEART: Regular rate and rhythm; No murmurs, rubs, or gallops  ABDOMEN: Soft, mild discomfort in lower quadrants   EXTREMITIES:  2+ edema        LABS:                        7.9    20.66 )-----------( 311      ( 27 Dec 2016 07:00 )             23.4     27 Dec 2016 07:00    132    |  100    |  10.0   ----------------------------<  79     4.2     |  22.0   |  0.48     Ca    6.7        27 Dec 2016 07:00  Phos  2.8       26 Dec 2016 07:05  Mg     1.8       26 Dec 2016 07:05                  RADIOLOGY & ADDITIONAL STUDIES:

## 2016-12-27 NOTE — PROGRESS NOTE ADULT - SUBJECTIVE AND OBJECTIVE BOX
OVERNIGHT EVENTS:    BRIEF HOSPITAL COURSE:    PRESENT SYMPTOMS:     PAIN SCALE:  0 = none  1 = mild   2 = moderate  3 = severe    Pain:     Dyspnea:  [ ] YES [ ] NO  Anxiety:  [ ] YES [ ] NO  Fatigue: [ ] YES [ ] NO  Nausea: [ ] YES [ ] NO  Loss of Appetite: [ ] YES [ ] NO  Other symptoms: __________    MEDICATIONS  (STANDING):  vancomycin    Solution 250milliGRAM(s) Oral every 6 hours  insulin lispro (HumaLOG) corrective regimen sliding scale  SubCutaneous three times a day before meals  dextrose 5%. 1000milliLiter(s) IV Continuous <Continuous>  dextrose 50% Injectable 12.5Gram(s) IV Push once  dextrose 50% Injectable 25Gram(s) IV Push once  dextrose 50% Injectable 25Gram(s) IV Push once  metoprolol succinate ER 50milliGRAM(s) Oral daily  dextrose 5%. 1000milliLiter(s) IV Continuous <Continuous>  tamsulosin 0.4milliGRAM(s) Oral at bedtime  polymyxin B IVPB [id number](s) IV Intermittent every 12 hours  clotrimazole Lozenge 1Lozenge Oral five times a day  saccharomyces boulardii 250milliGRAM(s) Oral two times a day  sodium chloride 0.9% 1000milliLiter(s) IV Continuous <Continuous>  diphenhydrAMINE   Capsule 25milliGRAM(s) Oral two times a day  acetaminophen   Tablet 325milliGRAM(s) Oral two times a day    MEDICATIONS  (PRN):  dextrose Gel 1Dose(s) Oral once PRN Blood Glucose LESS THAN 70 milliGRAM(s)/deciLiter  glucagon  Injectable 1milliGRAM(s) IntraMuscular once PRN Glucose <70 milliGRAM(s)/deciLiter  acetaminophen   Tablet. 650milliGRAM(s) Oral every 6 hours PRN headache/pain  benzocaine 15 mG/menthol 3.6 mG Lozenge 1Lozenge Oral three times a day PRN Sore Throat    Allergies    No Known Allergies    Review of Systems: Reviewed                     Negative:                     Positive: abdominal pain now mild  [ ] Unable to obtain due to poor mentation     PHYSICAL EXAM:    Vital Signs Last 24 Hrs  T(C): 37.2, Max: 37.2 (12-27 @ 08:05)  T(F): 99, Max: 99 (12-27 @ 08:05)  HR: 81 (81 - 81)  BP: 144/72 (144/72 - 144/72)  BP(mean): --  RR: 18 (18 - 18)  SpO2: --    General: alert and oriented    HEENT: dry mouth     Lungs: comfortable    CV: normal    GI: mild distension    : dorys    MSK: weakness    Skin: no rash    LABS:                        7.9    20.66 )-----------( 311      ( 27 Dec 2016 07:00 )             23.4     27 Dec 2016 07:00    132    |  100    |  10.0   ----------------------------<  79     4.2     |  22.0   |  0.48     Ca    6.7        27 Dec 2016 07:00  Phos  2.8       26 Dec 2016 07:05  Mg     1.8       26 Dec 2016 07:05    RADIOLOGY & ADDITIONAL STUDIES:    ADVANCE DIRECTIVES:  DNR/I OVERNIGHT EVENTS: had mild abdominal pain earlier now improved.     BRIEF HOSPITAL COURSE: 58F with uterine sarcoma with bladder invasion admitted with vaginal bleeding, found to have progression of disease, hospital course complicated by MDR Enterobacter, c diff colitis now no longer a candidate for chemotherapy.     PRESENT SYMPTOMS:     PAIN SCALE:  0 = none  1 = mild   2 = moderate  3 = severe    Pain: 0    Dyspnea:  [ ] YES [x] NO  Anxiety:  [ ] YES [x] NO  Fatigue: [x] YES [ ] NO  Nausea: [ ] YES [x] NO  Loss of Appetite: [ ] YES [x] NO  Other symptoms: __________    MEDICATIONS  (STANDING):  vancomycin    Solution 250milliGRAM(s) Oral every 6 hours  insulin lispro (HumaLOG) corrective regimen sliding scale  SubCutaneous three times a day before meals  dextrose 5%. 1000milliLiter(s) IV Continuous <Continuous>  dextrose 50% Injectable 12.5Gram(s) IV Push once  dextrose 50% Injectable 25Gram(s) IV Push once  dextrose 50% Injectable 25Gram(s) IV Push once  metoprolol succinate ER 50milliGRAM(s) Oral daily  dextrose 5%. 1000milliLiter(s) IV Continuous <Continuous>  tamsulosin 0.4milliGRAM(s) Oral at bedtime  polymyxin B IVPB [id number](s) IV Intermittent every 12 hours  clotrimazole Lozenge 1Lozenge Oral five times a day  saccharomyces boulardii 250milliGRAM(s) Oral two times a day  sodium chloride 0.9% 1000milliLiter(s) IV Continuous <Continuous>  diphenhydrAMINE   Capsule 25milliGRAM(s) Oral two times a day  acetaminophen   Tablet 325milliGRAM(s) Oral two times a day    MEDICATIONS  (PRN):  dextrose Gel 1Dose(s) Oral once PRN Blood Glucose LESS THAN 70 milliGRAM(s)/deciLiter  glucagon  Injectable 1milliGRAM(s) IntraMuscular once PRN Glucose <70 milliGRAM(s)/deciLiter  acetaminophen   Tablet. 650milliGRAM(s) Oral every 6 hours PRN headache/pain  benzocaine 15 mG/menthol 3.6 mG Lozenge 1Lozenge Oral three times a day PRN Sore Throat    Allergies    No Known Allergies    Review of Systems: Reviewed                     Negative:                     Positive: abdominal pain now mild  [ ] Unable to obtain due to poor mentation     PHYSICAL EXAM:    Vital Signs Last 24 Hrs  T(C): 37.2, Max: 37.2 (12-27 @ 08:05)  T(F): 99, Max: 99 (12-27 @ 08:05)  HR: 81 (81 - 81)  BP: 144/72 (144/72 - 144/72)  BP(mean): --  RR: 18 (18 - 18)  SpO2: --    General: alert and oriented    HEENT: dry mouth     Lungs: comfortable    CV: normal    GI: mild distension    : dorys    MSK: weakness    Skin: no rash    LABS:                        7.9    20.66 )-----------( 311      ( 27 Dec 2016 07:00 )             23.4     27 Dec 2016 07:00    132    |  100    |  10.0   ----------------------------<  79     4.2     |  22.0   |  0.48     Ca    6.7        27 Dec 2016 07:00  Phos  2.8       26 Dec 2016 07:05  Mg     1.8       26 Dec 2016 07:05    RADIOLOGY & ADDITIONAL STUDIES:    ADVANCE DIRECTIVES:  DNR/I

## 2016-12-28 NOTE — PROGRESS NOTE ADULT - SUBJECTIVE AND OBJECTIVE BOX
[facility] SAI COLLINS MD FACP   JARED NORTON MD  Diplomates American Board of Internal Medicine and Infecctious Diseases      [id number]  TRAN MCDONALD is a 58y  Female     CC:ADMITTED WITH SEVERE LEUKOCYTOSIS TO 40K    HX STAGE 4 UTERINE CANCER ON CHEMOTHERAPY  PT WITH C DIFF ON VANCO  ALSO WITH MDR ENTEROBACTER  ON POLYMYXIN  NL CREAT  TX     Past Medical & Surgical Hx:  PAST MEDICAL & SURGICAL HISTORY:  Uterine sarcoma  Cervical cancer  Uterine cancer  Dyslipidemia  Essential hypertension, hypertension with unspecified goal  Diabetes  S/P bariatric surgery: lap band      Problem List:  HEALTH ISSUES - PROBLEM Dx:  Clostridium difficile colitis: Clostridium difficile colitis  UTI (urinary tract infection): UTI (urinary tract infection)  Urinary retention: Urinary retention  Pulmonary embolism, other: Pulmonary embolism, other  Uterine sarcoma: Uterine sarcoma      ANTIBIOTICS:   vancomycin    Solution 250milliGRAM(s) Oral every 6 hours  POLYMYXIN THROUGH          Review of Systems: - Negative except as mentioned below.PAIN LLQ AND DIARRHEA      Physical Exam:    VVital Signs Last 24 Hrs  T(C): 37.6, Max: 37.6 (- @ 08:31)  T(F): 99.6, Max: 99.6 (12- @ 08:31)  HR: 71 (71 - 80)  BP: 123/57 (123/57 - 141/68)  BP(mean): --  RR: 18 (16 - 18)  SpO2: --    GEN: NAD, pleasant. ILL APPEARING  HEENT: normocephalic and atraumatic. EOMI. IVÁN. Moist mucosa. Clear Posterior pharynx.  NECK: Supple. No carotid bruits.  No lymphadenopathy or thyromegaly.  LUNGS: Clear to auscultation.  HEART: Regular rate and rhythm without murmur.  ABDOMEN: Soft, nontender, and nondistended.  Positive bowel sounds.  SL DISTENDED. ACTIVE BSS. TENDER NO REBOUND LLQ  EXTREMITIES: Without any cyanosis, clubbing, rash, lesions or edema.  NEUROLOGIC: Cranial nerves II through XII are grossly intact.  MUSCULOSKELETAL:  SKIN: No ulceration or induration present.      Labs:                                            8.5    23.17 )-----------( 284      ( 26 Dec 2016 07:05 )             24.8        Urinalysis Basic - ( 14 Dec 2016 03:46 )    Color: Pale Yellow / Appearance: very cloudy / S.010 / pH: x  Gluc: x / Ketone: Negative  / Bili: Negative / Urobili: Negative mg/dL   Blood: x / Protein: 100 mg/dL / Nitrite: Negative   Leuk Esterase: Moderate / RBC: 6-10 /HPF / WBC TNTC /HPF   Sq Epi: x / Non Sq Epi: Occasional / Bacteria: Occasional              MICROBIOLOGY  Clostridium difficile Toxin by PCR (16 @ 21:28)    C Diff by PCR Result: Detected    Clostridium difficile Toxin by PCR: RESULT INTERPRETATION:    Detected - Clostridium difficile toxin B detected by amplified DNA PCR .        RADIOLOGY  No significant retroperitoneal lymphadenopathy. Mildly enlarged left   para-aortic lymph nodes, unchanged. No definite pelvic adenopathy. A   Manning catheter is noted in the urinary bladder. There is diffuse   thickening of the bladder wall. There is enlargement of the uterus. There   is now diffuse thickening of the rectal wall which is a new finding since   the prior examination. In addition there is diffuse thickening of the   wall of the sigmoid, descending colon, splenic flexure. These findings   are new.

## 2016-12-28 NOTE — PROGRESS NOTE ADULT - PROBLEM SELECTOR PLAN 5
-psychosocial support. patient and her family to meet with hospice tomorrow to discuss disposition plans.

## 2016-12-28 NOTE — PROGRESS NOTE ADULT - ASSESSMENT
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

## 2016-12-28 NOTE — PROGRESS NOTE ADULT - ASSESSMENT
Uterine sarcoma, complicated by bladder invasion, UTI and bacteremia, completing course of IV Abx on January 1st, planned for hospice

## 2016-12-28 NOTE — PROGRESS NOTE ADULT - SUBJECTIVE AND OBJECTIVE BOX
GYNECOLOGIC ONCOLOGY PROGRESS NOTE      PROBLEMS:    Hematuria, gross  Clostridium difficile colitis  UTI (urinary tract infection)  Urinary retention  Pulmonary embolism, other  Uterine sarcoma      Pt seen and examined at bedside. Patient is without complaints.  Pain well-controlled.  Denies Nausea, Vomiting or Diarrhea.  Denies shortness of breath, chest pain or dyspnea on exertion.  Tolerating diet.  Patient with MDR enteroccoc, CRE E.coli, on polymyxin. Bacteria appears sensitive to avycaz  Patient followed up by ID for decision on PO abx, also followed up for hospice planning for discharge.   Urine is clear in saul, Lovenox renewed (for recent DVT)    OBJECTIVE:     VITALS:  T(F): 97.2, Max: 99 (12-27 @ 08:05)  HR: 75 (75 - 81)  BP: 130/71 (130/71 - 144/72)  RR: 18 (18 - 18)        MEDICATIONS  (STANDING):  vancomycin    Solution 250milliGRAM(s) Oral every 6 hours  insulin lispro (HumaLOG) corrective regimen sliding scale  SubCutaneous three times a day before meals  dextrose 5%. 1000milliLiter(s) IV Continuous <Continuous>  dextrose 50% Injectable 12.5Gram(s) IV Push once  dextrose 50% Injectable 25Gram(s) IV Push once  dextrose 50% Injectable 25Gram(s) IV Push once  metoprolol succinate ER 50milliGRAM(s) Oral daily  dextrose 5%. 1000milliLiter(s) IV Continuous <Continuous>  tamsulosin 0.4milliGRAM(s) Oral at bedtime  polymyxin B IVPB [id number](s) IV Intermittent every 12 hours  clotrimazole Lozenge 1Lozenge Oral five times a day  saccharomyces boulardii 250milliGRAM(s) Oral two times a day  sodium chloride 0.9% 1000milliLiter(s) IV Continuous <Continuous>  enoxaparin Injectable 70milliGRAM(s) SubCutaneous every 12 hours        Physical Exam:  Constitutional: NAD  Abdomen: soft, non-tender, non-distended  Extremities: no lower extremity edema or calve tenderness, Samir's sign negative.        LABS:                        7.9    20.66 )-----------( 311      ( 27 Dec 2016 07:00 )             23.4     27 Dec 2016 07:00    132    |  100    |  10.0   ----------------------------<  79     4.2     |  22.0   |  0.48     Ca    6.7        27 Dec 2016 07:00  Phos  2.8       26 Dec 2016 07:05  Mg     1.8       26 Dec 2016 07:05

## 2016-12-28 NOTE — PROGRESS NOTE ADULT - SUBJECTIVE AND OBJECTIVE BOX
pt s/p being transfused yesterday   currently palliative. not candidate for chemo        PAST MEDICAL & SURGICAL HISTORY:  Uterine sarcoma  Cervical cancer  Uterine cancer  Dyslipidemia  Essential hypertension, hypertension with unspecified goal  Diabetes  S/P bariatric surgery: lap band      ANTIMICROBIAL:  vancomycin    Solution 250milliGRAM(s) Oral every 6 hours  polymyxin B IVPB [id number](s) IV Intermittent every 12 hours  clotrimazole Lozenge 1Lozenge Oral five times a day    CARDIOVASCULAR:  metoprolol succinate ER 50milliGRAM(s) Oral daily  tamsulosin 0.4milliGRAM(s) Oral at bedtime    NEUROLOGIC:  acetaminophen   Tablet. 650milliGRAM(s) Oral every 6 hours PRN    ONCOLOGIC:    HEMATOLOGIC:  enoxaparin Injectable 70milliGRAM(s) SubCutaneous every 12 hours      ENDO/METABOLIC:  insulin lispro (HumaLOG) corrective regimen sliding scale  SubCutaneous three times a day before meals  dextrose Gel 1Dose(s) Oral once PRN  dextrose 50% Injectable 12.5Gram(s) IV Push once  dextrose 50% Injectable 25Gram(s) IV Push once  dextrose 50% Injectable 25Gram(s) IV Push once  glucagon  Injectable 1milliGRAM(s) IntraMuscular once PRN    IV FLUID/NUTRITION:  dextrose 5%. 1000milliLiter(s) IV Continuous <Continuous>  dextrose 5%. 1000milliLiter(s) IV Continuous <Continuous>  sodium chloride 0.9% 1000milliLiter(s) IV Continuous <Continuous>    TOPICAL:  benzocaine 15 mG/menthol 3.6 mG Lozenge 1Lozenge Oral three times a day PRN    IMMUNOLOGIC & OTHER  saccharomyces boulardii 250milliGRAM(s) Oral two times a day    SOCIAL HISTORY:    FAMILY HISTORY:  Family history of coronary artery disease      Vital Signs Last 24 Hrs  T(C): 37.1, Max: 37.5 (12-28 @ 06:42)  T(F): 98.7, Max: 99.5 (12-28 @ 06:42)  HR: 82 (75 - 93)  BP: 142/69 (130/71 - 147/74)  BP(mean): --  RR: 18 (18 - 18)  SpO2: 97% (97% - 97%)      REVIEW OF SYSTEMS:    NECK: No pain or stiffness  RESPIRATORY: No cough, wheezing, chills or hemoptysis; No shortness of breath  CARDIOVASCULAR: No chest pain, palpitations, dizziness, or leg swelling  GASTROINTESTINAL: No n/v   GENITOURINARY: with saul   NEUROLOGICAL: nl       PHYSICAL EXAM:    EYES: EOMI, PERRLA, conjunctiva and sclera clear  ENMT: No tonsillar erythema, exudates, or enlargement; Moist mucous membranes, Good dentition, No lesions  NECK: Supple, No JVD, Normal thyroid  NERVOUS SYSTEM:  Alert & Oriented X3,   CHEST/LUNG: Clear   HEART: Regular rate and rhythm; No murmurs, rubs, or gallops  ABDOMEN: Soft, Nontender, Nondistended; Bowel sounds present  EXTREMITIES:  2+edema  : with saul         LABS:                        10.6   20.26 )-----------( 342      ( 28 Dec 2016 06:51 )             30.3     28 Dec 2016 06:51    136    |  102    |  8.0    ----------------------------<  70     4.0     |  22.0   |  0.48     Ca    7.1        28 Dec 2016 06:51  Mg     1.4       28 Dec 2016 06:51    TPro  x      /  Alb  1.6    /  TBili  x      /  DBili  x      /  AST  x      /  ALT  x      /  AlkPhos  x      28 Dec 2016 06:51

## 2016-12-28 NOTE — PROGRESS NOTE ADULT - ASSESSMENT
PT IS 57yo FEMALE WITH UTERINE SARCOMA WITH C DIFF ON AND VANCO  BUT NOW WITH  MDR ENTEROBACTER  CONT POLYMYXIN  RENAL FXN STABLE  AWAITING THE RESULTS OF SEND OUT URINE CX FOR TESTING TO AVYCAZ   PT ON  VANCO HAVE FOR C DIFF   PT AFEBRILE  FEELS BETTER   PT WITH POSSIBLE HOSPICE  D/W PT - TX JAN 1

## 2016-12-28 NOTE — PROGRESS NOTE ADULT - SUBJECTIVE AND OBJECTIVE BOX
OVERNIGHT EVENTS: nothing new    BRIEF HOSPITAL COURSE:58F with uterine sarcoma with bladder invasion admitted with vaginal bleeding, found to have progression of disease, hospital course complicated by MDR Enterobacter, c diff colitis now no longer a candidate for chemotherapy.     PRESENT SYMPTOMS:     PAIN SCALE:  0 = none  1 = mild   2 = moderate  3 = severe    Pain: 0    Dyspnea:  [ ] YES [x] NO  Anxiety:  [ ] YES [x] NO  Fatigue: [ ] YES [x] NO  Nausea: [ ] YES [x] NO  Loss of Appetite: [ ] YES [x] NO  Other symptoms: __________    MEDICATIONS  (STANDING):  vancomycin    Solution 250milliGRAM(s) Oral every 6 hours  insulin lispro (HumaLOG) corrective regimen sliding scale  SubCutaneous three times a day before meals  dextrose 5%. 1000milliLiter(s) IV Continuous <Continuous>  dextrose 50% Injectable 12.5Gram(s) IV Push once  dextrose 50% Injectable 25Gram(s) IV Push once  dextrose 50% Injectable 25Gram(s) IV Push once  metoprolol succinate ER 50milliGRAM(s) Oral daily  dextrose 5%. 1000milliLiter(s) IV Continuous <Continuous>  tamsulosin 0.4milliGRAM(s) Oral at bedtime  polymyxin B IVPB [id number](s) IV Intermittent every 12 hours  clotrimazole Lozenge 1Lozenge Oral five times a day  saccharomyces boulardii 250milliGRAM(s) Oral two times a day  sodium chloride 0.9% 1000milliLiter(s) IV Continuous <Continuous>  enoxaparin Injectable 70milliGRAM(s) SubCutaneous every 12 hours    MEDICATIONS  (PRN):  dextrose Gel 1Dose(s) Oral once PRN Blood Glucose LESS THAN 70 milliGRAM(s)/deciLiter  glucagon  Injectable 1milliGRAM(s) IntraMuscular once PRN Glucose <70 milliGRAM(s)/deciLiter  acetaminophen   Tablet. 650milliGRAM(s) Oral every 6 hours PRN headache/pain  benzocaine 15 mG/menthol 3.6 mG Lozenge 1Lozenge Oral three times a day PRN Sore Throat      Allergies    No Known Allergies    Review of Systems: Reviewed                     Negative: no chest pain                     Positive:  [ ] Unable to obtain due to poor mentation     PHYSICAL EXAM:    Vital Signs Last 24 Hrs  T(C): 37.1, Max: 37.5 (12-28 @ 06:42)  T(F): 98.7, Max: 99.5 (12-28 @ 06:42)  HR: 82 (75 - 93)  BP: 142/69 (130/71 - 147/74)  BP(mean): --  RR: 18 (18 - 18)  SpO2: 97% (97% - 97%)    General: [x] alert  [x] oriented x ____ [ ] lethargic [ ] agitated                  [ ] cachexia  [ ] nonverbal  [ ] coma    HEENT: [x] normal  [ ] dry mouth  [ ] ET tube/trach    Lungs: [x] comfortable [ ] tachypnea/labored breathing  [ ] excessive secretions    CV: [x] normal  [ ] tachycardia    GI: [x] normal  [ ] distended  [ ] tender  [ ] no BS               [ ] PEG/NG/OG tube    : [x] normal  [ ] incontinent  [ ] oliguria/anuria  [ ] saul    MSK: [ ] normal  [x] weakness  [ ] edema             [ ] ambulatory  [ ] bedbound/wheelchair bound    Skin: [x] normal  [ ] pressure ulcers- Stage_____  [ ] no rash    LABS:                        10.6   20.26 )-----------( 342      ( 28 Dec 2016 06:51 )             30.3     28 Dec 2016 06:51    136    |  102    |  8.0    ----------------------------<  70     4.0     |  22.0   |  0.48     Ca    7.1        28 Dec 2016 06:51  Mg     1.4       28 Dec 2016 06:51    TPro  x      /  Alb  1.6    /  TBili  x      /  DBili  x      /  AST  x      /  ALT  x      /  AlkPhos  x      28 Dec 2016 06:51    RADIOLOGY & ADDITIONAL STUDIES:    ADVANCE DIRECTIVES:  DNR/I

## 2016-12-28 NOTE — PROGRESS NOTE ADULT - SUBJECTIVE AND OBJECTIVE BOX
Patient seen and evaluated  Feeling well, today another episode of diarrhea  Known C.Diff colitis this hospitalization, first episode, previous C.Diff testing in 10/2016 negative.   Patient on Polymyxin for CRE E.Coli in urine, also resistent enterobacter bacteremia.   Per Dr. Godfrey recommendation, will continue IV ABx until January 1st, then it can be D/Lalo  Patient planned for home hospice after completion of IV ABx  Patient followed by Urology, recommended D/C with saul in place.  Urine is clear for the past few days, therapeutic lovenox restarted.   Patient transfused yesterday for anemia, Hb 10.6 this morning.   Patient overall stable, WBC gradually trending down, afebrile.

## 2016-12-29 NOTE — PROGRESS NOTE ADULT - ASSESSMENT
HypoKalemia/ HypoCalcemia today corrected ca 9.6/ Hyponatremia  improved with IVF will cont IVF without Ca    Uterine Sarcoma Hospice eval noted

## 2016-12-29 NOTE — PROGRESS NOTE ADULT - I WAS PHYSICALLY PRESENT FOR THE KEY PORTIONS OF THE EVALUATION AND MANAGEMENT (E/M) SERVICE PROVIDED.  I AGREE WITH THE ABOVE HISTORY, PHYSICAL, AND PLAN WHICH I HAVE REVIEWED AND EDITED WHERE APPROPRIATE
Statement Selected

## 2016-12-29 NOTE — PROGRESS NOTE ADULT - ASSESSMENT
58 year old female with uterine sarcoma, c dif infection, and MDR Enterobacter cloacae in blood and urine.

## 2016-12-29 NOTE — PROGRESS NOTE ADULT - SUBJECTIVE AND OBJECTIVE BOX
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     SUBJECTIVE:    Patient is without complaints.  Pain well-controlled.  Patient denies vomiting or nausea but admits to 2 episodes  of diarrhea yesterday.   Denies shortness of breath, chest pain or dyspnea on exertion.  Tolerating diet.    OBJECTIVE:     VITALS:  T(F): 98.4, Max: 99.8 (12-28 @ 22:34)  HR: 75 (75 - 82)  BP: 160/67 (142/64 - 160/67)  RR: 18 (18 - 18)  SpO2: 94% (94% - 97%)  Wt(kg): --    I&O's Summary      MEDICATIONS  (STANDING):  vancomycin    Solution 250milliGRAM(s) Oral every 6 hours  insulin lispro (HumaLOG) corrective regimen sliding scale  SubCutaneous three times a day before meals  dextrose 5%. 1000milliLiter(s) IV Continuous <Continuous>  dextrose 50% Injectable 12.5Gram(s) IV Push once  dextrose 50% Injectable 25Gram(s) IV Push once  dextrose 50% Injectable 25Gram(s) IV Push once  metoprolol succinate ER 50milliGRAM(s) Oral daily  dextrose 5%. 1000milliLiter(s) IV Continuous <Continuous>  tamsulosin 0.4milliGRAM(s) Oral at bedtime  polymyxin B IVPB [id number](s) IV Intermittent every 12 hours  clotrimazole Lozenge 1Lozenge Oral five times a day  saccharomyces boulardii 250milliGRAM(s) Oral two times a day  sodium chloride 0.9% 1000milliLiter(s) IV Continuous <Continuous>  enoxaparin Injectable 70milliGRAM(s) SubCutaneous every 12 hours    MEDICATIONS  (PRN):  dextrose Gel 1Dose(s) Oral once PRN Blood Glucose LESS THAN 70 milliGRAM(s)/deciLiter  glucagon  Injectable 1milliGRAM(s) IntraMuscular once PRN Glucose <70 milliGRAM(s)/deciLiter  acetaminophen   Tablet. 650milliGRAM(s) Oral every 6 hours PRN headache/pain  benzocaine 15 mG/menthol 3.6 mG Lozenge 1Lozenge Oral three times a day PRN Sore Throat      Physical Exam:  Constitutional: NAD  Pulmonary: clear to auscultation bilaterally   Cardiovascular: Regular rate and rhythm   Abdomen: soft, non-tender, non-distended, normal bowel sounds.   Extremities: no lower extremity edema or calf tenderness, Samir's sign negative.        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     SUBJECTIVE:    Patient is without complaints.  Pain well-controlled.  Patient denies vomiting or nausea but admits to 2 episodes  of diarrhea yesterday.   Denies shortness of breath, chest pain or dyspnea on exertion.  Tolerating diet.    OBJECTIVE:     VITALS:  T(F): 98.4, Max: 99.8 (12-28 @ 22:34)  HR: 75 (75 - 82)  BP: 160/67 (142/64 - 160/67)  RR: 18 (18 - 18)  SpO2: 94% (94% - 97%)  Wt(kg): --    I&O's Summary      MEDICATIONS  (STANDING):  vancomycin    Solution 250milliGRAM(s) Oral every 6 hours  insulin lispro (HumaLOG) corrective regimen sliding scale  SubCutaneous three times a day before meals  dextrose 5%. 1000milliLiter(s) IV Continuous <Continuous>  dextrose 50% Injectable 12.5Gram(s) IV Push once  dextrose 50% Injectable 25Gram(s) IV Push once  dextrose 50% Injectable 25Gram(s) IV Push once  metoprolol succinate ER 50milliGRAM(s) Oral daily  dextrose 5%. 1000milliLiter(s) IV Continuous <Continuous>  tamsulosin 0.4milliGRAM(s) Oral at bedtime  polymyxin B IVPB [id number](s) IV Intermittent every 12 hours  clotrimazole Lozenge 1Lozenge Oral five times a day  saccharomyces boulardii 250milliGRAM(s) Oral two times a day  sodium chloride 0.9% 1000milliLiter(s) IV Continuous <Continuous>  enoxaparin Injectable 70milliGRAM(s) SubCutaneous every 12 hours    MEDICATIONS  (PRN):  dextrose Gel 1Dose(s) Oral once PRN Blood Glucose LESS THAN 70 milliGRAM(s)/deciLiter  glucagon  Injectable 1milliGRAM(s) IntraMuscular once PRN Glucose <70 milliGRAM(s)/deciLiter  acetaminophen   Tablet. 650milliGRAM(s) Oral every 6 hours PRN headache/pain  benzocaine 15 mG/menthol 3.6 mG Lozenge 1Lozenge Oral three times a day PRN Sore Throat      Physical Exam:  Constitutional: NAD  Pulmonary: clear to auscultation bilaterally   Cardiovascular: Regular rate and rhythm   Abdomen: soft, non-tender, non-distended, normal bowel sounds.   Extremities: no lower extremity edema or calf tenderness, Samir's sign negative.        LABS:                        9.9    14.01 )-----------( 382      ( 29 Dec 2016 06:17 )             29.1     28 Dec 2016 06:51    136    |  102    |  8.0    ----------------------------<  70     4.0     |  22.0   |  0.48     Ca    7.1        28 Dec 2016 06:51  Mg     1.4       28 Dec 2016 06:51

## 2016-12-29 NOTE — PROGRESS NOTE ADULT - PROBLEM SELECTOR PLAN 4
abx per ID
transfuse 3 units PRBCs
1. Medicine following, on therapeutic lovenox currently
-moderate assist with ADLs
-on polymyxin, ?duration of treatment - necessity of repeat cultures that have cleared?

## 2016-12-29 NOTE — PROGRESS NOTE ADULT - PROBLEM SELECTOR PLAN 2
on Vanco. pt refusing Abx secondary to nausea
1. Abx per ID
1. ID following, per them patient will need current antibiotics until January 1st
per GYN
-on PO vanco
-tylenol helped her.   -will add some narcotics PRN for severe pain

## 2016-12-29 NOTE — PROGRESS NOTE ADULT - SUBJECTIVE AND OBJECTIVE BOX
GYNECOLOGIC ONCOLOGY PROGRESS NOTE    POD#    PROBLEMS:    Enterobacter sepsis  Clostridium difficile colitis  UTI (urinary tract infection)  Urinary retention  Pulmonary embolism, other  Uterine sarcoma      Pt seen and examined at bedside. Patient is without complaints.  Pain well-controlled.  Flatus:  Denies Nausea, Vomiting. 2 episodes of diarrhea yesterday, patient is on IV ABx. planned to continue till january 1st, for enterbacter bacteremia.   Denies shortness of breath, chest pain or dyspnea on exertion.  Tolerating diet.  Continues Vancomycin for C.Diff colitis.     OBJECTIVE:     VITALS:  T(F): 98.4, Max: 99.8 (12-28 @ 22:34)  HR: 75 (75 - 82)  BP: 160/67 (142/64 - 160/67)  RR: 18 (18 - 18)  SpO2: 94% (94% - 97%)      MEDICATIONS  (STANDING):  vancomycin    Solution 250milliGRAM(s) Oral every 6 hours  insulin lispro (HumaLOG) corrective regimen sliding scale  SubCutaneous three times a day before meals  dextrose 5%. 1000milliLiter(s) IV Continuous <Continuous>  dextrose 50% Injectable 12.5Gram(s) IV Push once  dextrose 50% Injectable 25Gram(s) IV Push once  dextrose 50% Injectable 25Gram(s) IV Push once  metoprolol succinate ER 50milliGRAM(s) Oral daily  dextrose 5%. 1000milliLiter(s) IV Continuous <Continuous>  tamsulosin 0.4milliGRAM(s) Oral at bedtime  polymyxin B IVPB [id number](s) IV Intermittent every 12 hours  clotrimazole Lozenge 1Lozenge Oral five times a day  saccharomyces boulardii 250milliGRAM(s) Oral two times a day  sodium chloride 0.9% 1000milliLiter(s) IV Continuous <Continuous>  enoxaparin Injectable 70milliGRAM(s) SubCutaneous every 12 hours    MEDICATIONS  (PRN):  dextrose Gel 1Dose(s) Oral once PRN Blood Glucose LESS THAN 70 milliGRAM(s)/deciLiter  glucagon  Injectable 1milliGRAM(s) IntraMuscular once PRN Glucose <70 milliGRAM(s)/deciLiter  acetaminophen   Tablet. 650milliGRAM(s) Oral every 6 hours PRN headache/pain  benzocaine 15 mG/menthol 3.6 mG Lozenge 1Lozenge Oral three times a day PRN Sore Throat      Physical Exam:  Constitutional: NAD  Abdomen: soft, non-tender, non-distended  Extremities: no lower extremity edema or calve tenderness, Samir's sign negative.      LABS:                        9.9    14.01 )-----------( 382      ( 29 Dec 2016 06:17 )             29.1     28 Dec 2016 06:51    136    |  102    |  8.0    ----------------------------<  70     4.0     |  22.0   |  0.48     Ca    7.1        28 Dec 2016 06:51  Mg     1.4       28 Dec 2016 06:51    TPro  x      /  Alb  1.6    /  TBili  x      /  DBili  x      /  AST  x      /  ALT  x      /  AlkPhos  x      28 Dec 2016 06:51

## 2016-12-30 NOTE — PHYSICAL THERAPY INITIAL EVALUATION ADULT - BED MOBILITY LIMITATIONS, REHAB EVAL
decreased ability to use arms for pushing/pulling/decreased ability to use legs for bridging/pushing

## 2016-12-30 NOTE — PROGRESS NOTE ADULT - SUBJECTIVE AND OBJECTIVE BOX
Patient seen and evaluated  Feels better after our conversation this morning.   Motivated to start walking and being more active  Reports diarrhea, on IV ABX till january 1, 2017.  No pain, no nausea or vomiting  No chest pain, palpitations or dyspnea  Overall stable condition.  Manning in place, urine is clear  Lovenox reinstated 3 days ago for recent history of PE.

## 2016-12-30 NOTE — PHYSICAL THERAPY INITIAL EVALUATION ADULT - ADDITIONAL COMMENTS
Pt comes to us from a [facility], hospitalized since 7/16. Amb short distances with RW and assist in [facility]

## 2016-12-31 NOTE — PROGRESS NOTE ADULT - ASSESSMENT
58 y.o. woman wiht UTERINE SARCOMA, C DIFF VANCO PO  MDR ENTEROBACTER bacteremia and bacteruria  CONT POLYMYXIN    AWAITING THE RESULTS OF SEND OUT URINE CX FOR TESTING TO [facility]

## 2016-12-31 NOTE — PROGRESS NOTE ADULT - SUBJECTIVE AND OBJECTIVE BOX
[facility]     Allergies:  No Known Allergies      Medications:  vancomycin    Solution 250milliGRAM(s) Oral every 6 hours  insulin lispro (HumaLOG) corrective regimen sliding scale  SubCutaneous three times a day before meals  dextrose 5%. 1000milliLiter(s) IV Continuous <Continuous>  dextrose Gel 1Dose(s) Oral once PRN  dextrose 50% Injectable 12.5Gram(s) IV Push once  dextrose 50% Injectable 25Gram(s) IV Push once  dextrose 50% Injectable 25Gram(s) IV Push once  glucagon  Injectable 1milliGRAM(s) IntraMuscular once PRN  metoprolol succinate ER 50milliGRAM(s) Oral daily  dextrose 5%. 1000milliLiter(s) IV Continuous <Continuous>  acetaminophen   Tablet. 650milliGRAM(s) Oral every 6 hours PRN  tamsulosin 0.4milliGRAM(s) Oral at bedtime  benzocaine 15 mG/menthol 3.6 mG Lozenge 1Lozenge Oral three times a day PRN  polymyxin B IVPB [id number](s) IV Intermittent every 12 hours  clotrimazole Lozenge 1Lozenge Oral five times a day  saccharomyces boulardii 250milliGRAM(s) Oral two times a day  enoxaparin Injectable 70milliGRAM(s) SubCutaneous every 12 hours  sodium chloride 0.9% with potassium chloride 20 mEq/L 1000milliLiter(s) IV Continuous <Continuous>  ondansetron Injectable 4milliGRAM(s) IV Push every 6 hours PRN  sodium chloride 1Gram(s) Oral once            REVIEW OF SYSTEMS:  CONSTITUTIONAL:  as per HPI  HEENT:  Eyes:  No diplopia or blurred vision. ENT:  No earache, sore throat or runny nose.  CARDIOVASCULAR:  No pressure, squeezing, strangling, tightness, heaviness or aching about the chest, neck, axilla or epigastrium.  RESPIRATORY:  No cough, shortness of breath, PND or orthopnea.  GASTROINTESTINAL:  No nausea, vomiting or diarrhea.  GENITOURINARY:  No dysuria, frequency or urgency. No Blood in urine  MUSCULOSKELETAL:  no joint aches, no muscle pain  SKIN:  No change in skin, hair or nails.  NEUROLOGIC:  No paresthesias, fasciculations, seizures or weakness.  PSYCHIATRIC:  No disorder of thought or mood.  ENDOCRINE:  No heat or cold intolerance, polyuria or polydipsia.  HEMATOLOGICAL:  No easy bruising or bleeding.            Physical Exam:  ICU Vital Signs Last 24 Hrs  T(C): 36.9, Max: 36.9 (12-31 @ 07:26)  T(F): 98.4, Max: 98.4 (12-31 @ 07:26)  HR: 73 (70 - 76)  BP: 134/62 (118/60 - 139/65)  BP(mean): --  ABP: --  ABP(mean): --  RR: 14 (14 - 16)  SpO2: --    GEN: NAD, pleasant  HEENT: normocephalic and atraumatic. EOMI. IVÁN.    NECK: Supple. No carotid bruits.  No lymphadenopathy or thyromegaly.  LUNGS: Clear to auscultation.  HEART: Regular rate and rhythm without murmur.  ABDOMEN: Soft, nontender, and nondistended.  Positive bowel sounds.    EXTREMITIES: Without any cyanosis, clubbing, rash, lesions or edema.  NEUROLOGIC: Cranial nerves II through XII are grossly intact.  PSYCHIATRIC: Appropriate affect .  SKIN: No ulceration or induration present.        Labs:  31 Dec 2016 07:12    132    |  102    |  7.0    ----------------------------<  75     3.8     |  23.0   |  0.67     Ca    7.3        31 Dec 2016 07:12  Mg     1.2       31 Dec 2016 07:12    TPro  x      /  Alb  1.4    /  TBili  x      /  DBili  x      /  AST  x      /  ALT  x      /  AlkPhos  x      30 Dec 2016 06:09                          8.6    10.74 )-----------( 409      ( 31 Dec 2016 07:12 )             26.1           LIVER FUNCTIONS - ( 30 Dec 2016 06:09 )  Alb: 1.4 g/dL / Pro: x     / ALK PHOS: x     / ALT: x     / AST: x     / GGT: x               CAPILLARY BLOOD GLUCOSE  72 (31 Dec 2016 07:26)  71 (30 Dec 2016 20:06)  108 (30 Dec 2016 12:15)        RECENT CULTURES:  12-26 .Other XXXX XXXX XXXX [facility]   patient seen and examined.  Reports having persistent diarrhea  6 times since last evening. watery.  + abd pain    Allergies:  No Known Allergies      Medications:  vancomycin    Solution 250milliGRAM(s) Oral every 6 hours  insulin lispro (HumaLOG) corrective regimen sliding scale  SubCutaneous three times a day before meals  dextrose 5%. 1000milliLiter(s) IV Continuous <Continuous>  dextrose Gel 1Dose(s) Oral once PRN  dextrose 50% Injectable 12.5Gram(s) IV Push once  dextrose 50% Injectable 25Gram(s) IV Push once  dextrose 50% Injectable 25Gram(s) IV Push once  glucagon  Injectable 1milliGRAM(s) IntraMuscular once PRN  metoprolol succinate ER 50milliGRAM(s) Oral daily  dextrose 5%. 1000milliLiter(s) IV Continuous <Continuous>  acetaminophen   Tablet. 650milliGRAM(s) Oral every 6 hours PRN  tamsulosin 0.4milliGRAM(s) Oral at bedtime  benzocaine 15 mG/menthol 3.6 mG Lozenge 1Lozenge Oral three times a day PRN  polymyxin B IVPB [id number](s) IV Intermittent every 12 hours  clotrimazole Lozenge 1Lozenge Oral five times a day  saccharomyces boulardii 250milliGRAM(s) Oral two times a day  enoxaparin Injectable 70milliGRAM(s) SubCutaneous every 12 hours  sodium chloride 0.9% with potassium chloride 20 mEq/L 1000milliLiter(s) IV Continuous <Continuous>  ondansetron Injectable 4milliGRAM(s) IV Push every 6 hours PRN  sodium chloride 1Gram(s) Oral once       REVIEW OF SYSTEMS:  CONSTITUTIONAL:  as per HPI  HEENT:  Eyes:  No diplopia or blurred vision. ENT:  No earache, sore throat or runny nose.  CARDIOVASCULAR:  No pressure, squeezing, strangling, tightness, heaviness or aching about the chest, neck, axilla or epigastrium.  RESPIRATORY:  No cough, shortness of breath, PND or orthopnea.  GASTROINTESTINAL:  + diarrhea.  GENITOURINARY:  No dysuria, frequency or urgency. No Blood in urine  MUSCULOSKELETAL:  no joint aches, no muscle pain  SKIN:  No change in skin, hair or nails.  NEUROLOGIC:  No paresthesias, fasciculations, seizures or weakness.  PSYCHIATRIC:  No disorder of thought or mood.  ENDOCRINE:  No heat or cold intolerance, polyuria or polydipsia.  HEMATOLOGICAL:  No easy bruising or bleeding.           Physical Exam:  ICU Vital Signs Last 24 Hrs  T(C): 36.9, Max: 36.9 (12-31 @ 07:26)  T(F): 98.4, Max: 98.4 (12-31 @ 07:26)  HR: 73 (70 - 76)  BP: 134/62 (118/60 - 139/65)  RR: 14 (14 - 16)    GEN: NAD, thin, cachectic  HEENT: normocephalic and atraumatic. EOMI. IVÁN.    NECK: Supple.   LUNGS: diminished breath sounds  HEART: Regular rate and rhythm without murmur.  ABDOMEN: Soft, diffuse tenderness, Positive bowel sounds.    + saul with blood tinged sediment  EXTREMITIES: trace edema  NEUROLOGIC: Cranial nerves II through XII are grossly intact.  PSYCHIATRIC: Appropriate affect .  SKIN: No ulceration or induration present.        Labs:  31 Dec 2016 07:12    132    |  102    |  7.0    ----------------------------<  75     3.8     |  23.0   |  0.67     Ca    7.3        31 Dec 2016 07:12  Mg     1.2       31 Dec 2016 07:12    TPro  x      /  Alb  1.4    /  TBili  x      /  DBili  x      /  AST  x      /  ALT  x      /  AlkPhos  x      30 Dec 2016 06:09                          8.6    10.74 )-----------( 409      ( 31 Dec 2016 07:12 )             26.1       LIVER FUNCTIONS - ( 30 Dec 2016 06:09 )  Alb: 1.4 g/dL / Pro: x     / ALK PHOS: x     / ALT: x     / AST: x     / GGT: x           CAPILLARY BLOOD GLUCOSE  72 (31 Dec 2016 07:26)  71 (30 Dec 2016 20:06)  108 (30 Dec 2016 12:15)    RECENT CULTURES:  12-26 .Other XXXX XXXX XXXX

## 2016-12-31 NOTE — PROVIDER CONTACT NOTE (OTHER) - SITUATION
Critical for Ca 6.7
Patient had a large bloody bowel movement, loose and black in color.
critical lab for WBC, Ca, BC positive

## 2016-12-31 NOTE — PROGRESS NOTE ADULT - SUBJECTIVE AND OBJECTIVE BOX
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 Complains of diarrhea over the night and abdominal discomfort  Patient on the last day of IV Polymyxin.   Pain well-controlled.  Sat in the chair for 2 hours yesterday  Denies Nausea, Vomiting .  Denies shortness of breath, chest pain.  Tolerating regular diet.  Continues therapeutic lovenox.   Manning in place, clear urine.   Continues Vancomycin for C.Diff diarrhea      OBJECTIVE:     VITALS:  T(F): 98.4, Max: 98.4 (12-31 @ 07:26)  HR: 73 (70 - 76)  BP: 134/62 (118/60 - 139/65)  RR: 14 (14 - 16)      MEDICATIONS  (STANDING):  vancomycin    Solution 250milliGRAM(s) Oral every 6 hours  insulin lispro (HumaLOG) corrective regimen sliding scale  SubCutaneous three times a day before meals  dextrose 5%. 1000milliLiter(s) IV Continuous <Continuous>  dextrose 50% Injectable 12.5Gram(s) IV Push once  dextrose 50% Injectable 25Gram(s) IV Push once  dextrose 50% Injectable 25Gram(s) IV Push once  metoprolol succinate ER 50milliGRAM(s) Oral daily  dextrose 5%. 1000milliLiter(s) IV Continuous <Continuous>  tamsulosin 0.4milliGRAM(s) Oral at bedtime  polymyxin B IVPB [id number](s) IV Intermittent every 12 hours  clotrimazole Lozenge 1Lozenge Oral five times a day  saccharomyces boulardii 250milliGRAM(s) Oral two times a day  enoxaparin Injectable 70milliGRAM(s) SubCutaneous every 12 hours  sodium chloride 0.9% with potassium chloride 20 mEq/L 1000milliLiter(s) IV Continuous <Continuous>  magnesium sulfate  IVPB 2Gram(s) IV Intermittent once    MEDICATIONS  (PRN):  dextrose Gel 1Dose(s) Oral once PRN Blood Glucose LESS THAN 70 milliGRAM(s)/deciLiter  glucagon  Injectable 1milliGRAM(s) IntraMuscular once PRN Glucose <70 milliGRAM(s)/deciLiter  acetaminophen   Tablet. 650milliGRAM(s) Oral every 6 hours PRN headache/pain  benzocaine 15 mG/menthol 3.6 mG Lozenge 1Lozenge Oral three times a day PRN Sore Throat  ondansetron Injectable 4milliGRAM(s) IV Push every 6 hours PRN Nausea and/or Vomiting      Physical Exam:  Constitutional: NAD  Pulmonary: clear to auscultation bilaterally   Cardiovascular: Regular rate and rhythm   Abdomen: soft, non-tender, non-distended, normal bowel signs  Extremities: no lower extremity edema or calve tenderness, Samir's sign negative.  Incision: Clean, dry, intact.  Without signs of infection or hernia.      LABS:                        9.4    11.25 )-----------( 361      ( 30 Dec 2016 06:09 )             28.3     31 Dec 2016 07:12    132    |  102    |  7.0    ----------------------------<  75     3.8     |  23.0   |  0.67     Ca    7.3        31 Dec 2016 07:12  Mg     1.2       31 Dec 2016 07:12    TPro  x      /  Alb  1.4    /  TBili  x      /  DBili  x      /  AST  x      /  ALT  x      /  AlkPhos  x      30 Dec 2016 06:09

## 2016-12-31 NOTE — PROGRESS NOTE ADULT - SUBJECTIVE AND OBJECTIVE BOX
Called to see patient with n/v x1 and abdominal pain.  Patient also reported to have bloody diarrhea that started this AM.    Upon entering patient appears comfortable; states the pain was likely "psychosomatic" and that she feels better.  She has not vomited for approx 1 hour but has not been eating and family states she continues to "dry heave".     AOx3, NAD  Abd: soft, approp TTP, ND    Cbc ordered earlier today, Hb dropped from 8.6 this AM.                        8.0    12.18 )-----------( 395      ( 31 Dec 2016 13:57 )             23.9     D/w Dr. Ordaz  Plan for 2U PRBC transfusion.  Phenergan IV for nausea  Dilaudid IV ordered if needed for continued pain  Please call PA if symptoms do not resolve or patient develops abdominal distension.

## 2016-12-31 NOTE — PROGRESS NOTE ADULT - ASSESSMENT
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

## 2017-01-01 NOTE — PROGRESS NOTE ADULT - ASSESSMENT
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 also Complicated by UTI and Bacteremia, on IV ABx

## 2017-01-01 NOTE — PROGRESS NOTE ADULT - SUBJECTIVE AND OBJECTIVE BOX
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 2 episodes of vaginal bleeding, once yesterday, and one this morning with a significant amount of blood as per nurses  Patient got transfused yesterday, Hb this morning 7.7, will get 2 more PRBC  Reports she had nausea and vomiting over night.  No nausea now.   Feels weak.   Denies pain  Reports intermittent diarrhea.   Denies shortness of breath, chest pain or dyspnea on exertion.  Tolerating diet.  On therapeutic lovenox for recent Hx of PE  Manning with clear urine   Today last day of IV Polymyxin for CRE E.coli UTI and MDR Enterobacter bacteremia.  Continues vancomycin for C.Diff colitis.  Difficulty measuring BP, patient has strong palpable peripheral pulses on exam      OBJECTIVE:     VITALS:  T(F): 99.8, Max: 99.8 (01-01 @ 09:05)  HR: 94 (78 - 95)  BP: 65/46 (65/46 - 127/59)  RR: 18 (16 - 20)  SpO2: 97% (97% - 97%)      MEDICATIONS  (STANDING):  insulin lispro (HumaLOG) corrective regimen sliding scale  SubCutaneous three times a day before meals  dextrose 5%. 1000milliLiter(s) IV Continuous <Continuous>  dextrose 50% Injectable 12.5Gram(s) IV Push once  dextrose 50% Injectable 25Gram(s) IV Push once  dextrose 50% Injectable 25Gram(s) IV Push once  metoprolol succinate ER 50milliGRAM(s) Oral daily  dextrose 5%. 1000milliLiter(s) IV Continuous <Continuous>  tamsulosin 0.4milliGRAM(s) Oral at bedtime  polymyxin B IVPB 270419Xmlo(s) IV Intermittent every 12 hours  clotrimazole Lozenge 1Lozenge Oral five times a day  saccharomyces boulardii 250milliGRAM(s) Oral two times a day  enoxaparin Injectable 70milliGRAM(s) SubCutaneous every 12 hours  sodium chloride 0.9% with potassium chloride 20 mEq/L 1000milliLiter(s) IV Continuous <Continuous>  vancomycin    Solution 500milliGRAM(s) Oral every 6 hours    MEDICATIONS  (PRN):  dextrose Gel 1Dose(s) Oral once PRN Blood Glucose LESS THAN 70 milliGRAM(s)/deciLiter  glucagon  Injectable 1milliGRAM(s) IntraMuscular once PRN Glucose <70 milliGRAM(s)/deciLiter  acetaminophen   Tablet. 650milliGRAM(s) Oral every 6 hours PRN headache/pain  benzocaine 15 mG/menthol 3.6 mG Lozenge 1Lozenge Oral three times a day PRN Sore Throat  ondansetron Injectable 4milliGRAM(s) IV Push every 6 hours PRN Nausea and/or Vomiting      Physical Exam:  Constitutional: NAD  Abdomen: soft, non-tender, non-distended  Extremities: no lower extremity edema or calve tenderness, Samir's sign negative. normal peripheral pulses.         LABS:                        7.7    14.58 )-----------( 330      ( 01 Jan 2017 06:16 )             22.7     31 Dec 2016 07:12    132    |  102    |  7.0    ----------------------------<  75     3.8     |  23.0   |  0.67     Ca    7.3        31 Dec 2016 07:12  Mg     1.2       31 Dec 2016 07:12

## 2017-01-02 NOTE — PROVIDER CONTACT NOTE (CRITICAL VALUE NOTIFICATION) - NAME OF MD/NP/PA/DO NOTIFIED:
Nay LERMA
azra
azra LERMA
fidel LERMA
Dakota Ahn
Dr. Lewis service
Dr. Lindo
Jarred Singer
Medical FLORENTIN Hendrickson
Stephanie on Dr rojas team
kimmie LERMA for Lewis
michael surg pa
Lucrecia LERMA

## 2017-01-02 NOTE — PROVIDER CONTACT NOTE (CRITICAL VALUE NOTIFICATION) - TEST AND RESULT REPORTED:
CAt 6.7
Ca  6.8
WBC 43.94
WBC 69.75
critical wbc 47.65
critical wbc 50.08
potassium 2.9
urine
Ca 6.7
Urine and BC + MDRO Enterbacter Kloacae

## 2017-01-02 NOTE — PROVIDER CONTACT NOTE (CRITICAL VALUE NOTIFICATION) - PERSON GIVING RESULT:
christina youssef
Catherine
Mary Ellen eagle
Melissa guzman from the lab
Noe Brannon/Chem
PRINCE Garcia/fco
dereck walker
eloisa ruiz from lab
jalyn vila chem lab
kelsey peters
lab
[facility]

## 2017-01-02 NOTE — PROGRESS NOTE ADULT - SUBJECTIVE AND OBJECTIVE BOX
ALLERGIES:  No Known Allergies    Patient is a 58y Female  Seen and examined at bedside.  Had complained of nausea last evening which she does not have presently  Nontoxic appearing    DIET:  [    ] NPO    [     ] Clears    [     ] Fulls    [    ]  Soft   [ x   ] Regular    [    ] DASH    PHYSICAL EXAM:    VITALS:  T(C): 36.7, Max: 37.7 (01-01 @ 09:05)  HR: 90 (81 - 94)  BP: 82/50 (65/46 - 105/60)  RR: 18 (18 - 18)  SpO2: 100% (97% - 100%)  Wt(kg): --    Lungs:  Clear B/L, good air exchange  Abdomen:  soft NT +BS  Calves:   soft, nontender  :  saul in place with clear urine in bag      OUTPUT:  Urine is clear color    I & Os for current day (as of 01-02 @ 08:12)  =============================================  IN: 543 ml / OUT: 500 ml / NET: 43 ml      LABS:                        8.8    17.11 )-----------( 281      ( 01 Jan 2017 22:50 )             25.5       01 Jan 2017 22:50    130    |  99     |  25.0   ----------------------------<  108    4.7     |  24.0   |  1.48     Ca    6.8        01 Jan 2017 22:50            RADIOLOGY:    PAST MEDICAL & SURGICAL HISTORY:  Uterine sarcoma  Cervical cancer  Uterine cancer  Dyslipidemia  Essential hypertension, hypertension with unspecified goal  Diabetes  S/P bariatric surgery: lap band      IMPRESSION:      PLAN:  Continue current care management  DVT prophylaxis  Plan per  ALLERGIES:  No Known Allergies    Patient is a 58y Female  Seen and examined at bedside.  Had complained of nausea last evening which she does not have presently  Nontoxic appearing    DIET:  [    ] NPO    [     ] Clears    [     ] Fulls    [    ]  Soft   [ x   ] Regular    [    ] DASH    PHYSICAL EXAM:    VITALS:  T(C): 36.7, Max: 37.7 (01-01 @ 09:05)  HR: 90 (81 - 94)  BP: 82/50 (65/46 - 105/60)  RR: 18 (18 - 18)  SpO2: 100% (97% - 100%)  Wt(kg): --    Lungs:  Clear B/L, good air exchange  Abdomen:  soft NT +BS  Calves:   soft, nontender  :  saul in place with clear urine in bag      OUTPUT:  Urine is clear color    I & Os for current day (as of 01-02 @ 08:12)  =============================================  IN: 543 ml / OUT: 500 ml / NET: 43 ml      LABS:                        8.8    17.11 )-----------( 281      ( 01 Jan 2017 22:50 )             25.5       01 Jan 2017 22:50    130    |  99     |  25.0   ----------------------------<  108    4.7     |  24.0   |  1.48     Ca    6.8        01 Jan 2017 22:50            RADIOLOGY: none    PAST MEDICAL & SURGICAL HISTORY:  Uterine sarcoma  Cervical cancer  Uterine cancer  Dyslipidemia  Essential hypertension, hypertension with unspecified goal  Diabetes  S/P bariatric surgery: lap band      IMPRESSION:    Uterine sarcome, 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 Lewis  Continue Saul - monitor I/Os, cont irrigating saul q1hour  Awaiting labs for today ALLERGIES:  No Known Allergies    Patient is a 58y Female  Seen and examined at bedside.  Had complained of nausea last evening which she does not have presently  Nontoxic appearing    DIET:  [    ] NPO    [     ] Clears    [     ] Fulls    [    ]  Soft   [ x   ] Regular    [    ] DASH    PHYSICAL EXAM:    VITALS:  T(C): 36.7, Max: 37.7 (01-01 @ 09:05)  HR: 90 (81 - 94)  BP: 82/50 (65/46 - 105/60)  RR: 18 (18 - 18)  SpO2: 100% (97% - 100%)  Wt(kg): --    Lungs:  Clear B/L, good air exchange  Abdomen:  soft NT +BS  Calves:   soft, nontender  :  saul in place with blood tinged urine in bag      OUTPUT:  Urine is clear color    I & Os for current day (as of 01-02 @ 08:12)  =============================================  IN: 543 ml / OUT: 500 ml / NET: 43 ml      LABS:                        8.8    17.11 )-----------( 281      ( 01 Jan 2017 22:50 )             25.5       01 Jan 2017 22:50    130    |  99     |  25.0   ----------------------------<  108    4.7     |  24.0   |  1.48     Ca    6.8        01 Jan 2017 22:50            RADIOLOGY: none    PAST MEDICAL & SURGICAL HISTORY:  Uterine sarcoma  Cervical cancer  Uterine cancer  Dyslipidemia  Essential hypertension, hypertension with unspecified goal  Diabetes  S/P bariatric surgery: lap band      IMPRESSION:    Uterine sarcome, 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 Lewis  Continue Saul - monitor I/Os, cont irrigating saul q1hour  Awaiting labs for today

## 2017-01-02 NOTE — PROGRESS NOTE ADULT - PROBLEM SELECTOR PLAN 3
as per GYN
pt being transfused
1. ID following, patient currently on vancomycin. Per them, vancomycin will not be necessary at discharge.
1. shazia per Dr. Aldrich
-on polymyxin until Jan 1st per ID Dr. Godfrey.
- continue polymyxin   - stable kidney function  - wbc improving
-assist with most ADLs

## 2017-01-02 NOTE — PROGRESS NOTE ADULT - ATTENDING COMMENTS
More than 50% time spent in counseling and coordinating care. 35 Minutes.     Thank you for the opportunity to assist with the care of this patient.   [facility] [phone].
Patient seen and examined with family at bedside.  Findings from cystoscopy discussed with patient and her family in detail.  Her overall poor prognosis, poor performance status and the extent of her tumor involvement and infection were discussed.  At present, it is not likely that she will ever be well enough to get chemotherapy.  Even if she does improve, the chance of chemotherapy having any palliative benefit is extremely small.    Given this circumstance, I explained to the patient why I feel it is time to transition to hospice care.  The rationale for home hospice was reviewed in detail.    The rationale for DNR/DNI was also reviewed and the patient gave verbal consent with her family present.    Will consult care management and hospice to make the necessary arrangements.  The patient and her family are aware that I will be away for the holiday break until January 2nd but that my team will continue to manage her care while she is in the hospital.
IV adjusted
IV antibiotics, Fluid
More than 50% time spent in counseling and coordinating care. 35 Minutes.     Thank you for the opportunity to assist with the care of this patient.   [facility] [phone].
Pt  to go on hospice as per GYN
Patient seen and evaluated with PA  Reports having nausea and vomiting overnight, now no nausea or vomiting.  Patient is on Lovenox, stopped at this point due to significant vaginal bleeding with severe symptomatic anemia Hb 6.2 this AM  Patient was transfused 2XPRBC yesterday. Manning is blood tinged and cloudy but no bright red blood.  Patient completed her IV Polymyxin, still on PO Vancomycin for C.diff colitis.   Plan to send Urine for culture, transfuse 3XPRBC, continue vancomycin and hold the lovenox.
Agree with above note.  Patient overall stable, planned for IV ABx till January 1 2017, then planned on hospice   Path for bladder - benign, cytology pending.  Continues PO vancomycin.
Patient seen and examined.  Agree with abx management.  Overall stable but will consult renal for renal insufficiency.  Overall plan is to conitnue outpatient palliative chemotherapy if condition improves enough to safely get more chemo.
Patient seen and examined.  Case discussed with ID.  Will consult urology regarding persistent urinary retention.  ? requires saul again  Other wise agree with plans to reimage if WBC does not improve.
Patient seen and evaluated, feeling well, reports no diarrhea for 48 hours.   Bladder irrigation stopped, urine in saul is clear  Patient on IV polymyxin for Multi resistant enterobacter.  Overall feeling well this morning  will continue with regular diet, follow up urine outpt and color.

## 2017-01-02 NOTE — PROGRESS NOTE ADULT - SUBJECTIVE AND OBJECTIVE BOX
pt being cleaned by aide when in room   pt s/p 14u of PRBCs   still on ABx       PAST MEDICAL & SURGICAL HISTORY:  Uterine sarcoma  Cervical cancer  Uterine cancer  Dyslipidemia  Essential hypertension, hypertension with unspecified goal  Diabetes  S/P bariatric surgery: lap band      ANTIMICROBIAL:  clotrimazole Lozenge 1Lozenge Oral five times a day  vancomycin    Solution 500milliGRAM(s) Oral every 6 hours    CARDIOVASCULAR:  metoprolol succinate ER 50milliGRAM(s) Oral daily  tamsulosin 0.4milliGRAM(s) Oral at bedtime      NEUROLOGIC:  acetaminophen   Tablet. 650milliGRAM(s) Oral every 6 hours PRN  ondansetron Injectable 4milliGRAM(s) IV Push every 6 hours PRN  oxyCODONE  5 mG/acetaminophen 325 mG 1Tablet(s) Oral every 4 hours PRN  oxyCODONE  5 mG/acetaminophen 325 mG 2Tablet(s) Oral every 4 hours PRN  HYDROmorphone  Injectable 1milliGRAM(s) IV Push every 4 hours PRN  metoclopramide Injectable 10milliGRAM(s) IV Push every 6 hours PRN      ENDO/METABOLIC:  insulin lispro (HumaLOG) corrective regimen sliding scale  SubCutaneous three times a day before meals  dextrose Gel 1Dose(s) Oral once PRN  dextrose 50% Injectable 12.5Gram(s) IV Push once  dextrose 50% Injectable 25Gram(s) IV Push once  dextrose 50% Injectable 25Gram(s) IV Push once  glucagon  Injectable 1milliGRAM(s) IntraMuscular once PRN    IV FLUID/NUTRITION:  sodium chloride 0.9%. 1000milliLiter(s) IV Continuous <Continuous>  sodium chloride 1Gram(s) Oral daily    TOPICAL:  benzocaine 15 mG/menthol 3.6 mG Lozenge 1Lozenge Oral three times a day PRN    IMMUNOLOGIC & OTHER  saccharomyces boulardii 250milliGRAM(s) Oral two times a day        Allergies    No Known Allergies    SOCIAL HISTORY:    FAMILY HISTORY:  Family history of coronary artery disease      Vital Signs Last 24 Hrs  T(C): 36.7, Max: 36.8 (01-01 @ 18:15)  T(F): 98, Max: 98.3 (01-01 @ 18:15)  HR: 96 (84 - 96)  BP: 82/40 (75/51 - 105/60)  BP(mean): --  RR: 18 (16 - 18)  SpO2: 100% (97% - 100%)        unable to do exam as pt was being attended to by the aide.  family at bedside         LABS:                        6.2    18.87 )-----------( 264      ( 02 Jan 2017 09:25 )             17.6     02 Jan 2017 09:25    129    |  99     |  32.0   ----------------------------<  122    4.9     |  20.0   |  1.59     Ca    6.9        02 Jan 2017 09:25

## 2017-01-03 NOTE — PROGRESS NOTE ADULT - PROBLEM/PLAN-4
DISPLAY PLAN FREE TEXT

## 2017-01-03 NOTE — PROGRESS NOTE ADULT - PROBLEM SELECTOR PROBLEM 1
Clostridium difficile colitis
Enterobacter sepsis
Gram negative sepsis
Hematuria, gross
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
Uterine sarcoma
Anemia due to blood loss
Clostridium difficile colitis
Enterobacter sepsis
Gram negative sepsis
Hematuria, gross
Hypotension, unspecified hypotension type
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
Urinary retention
Urinary tract infection without hematuria, site unspecified
Urinary tract infection without hematuria, site unspecified
Uterine sarcoma
Gram negative sepsis
Uterine sarcoma
Uterine sarcoma

## 2017-01-03 NOTE — PROGRESS NOTE ADULT - PROBLEM/PLAN-2
DISPLAY PLAN FREE TEXT

## 2017-01-03 NOTE — PROGRESS NOTE ADULT - PROBLEM/PLAN-1
DISPLAY PLAN FREE TEXT

## 2017-01-03 NOTE — PROGRESS NOTE ADULT - PROBLEM/PLAN-3
DISPLAY PLAN FREE TEXT

## 2017-01-03 NOTE — PROGRESS NOTE ADULT - PROVIDER SPECIALTY LIST ADULT
Cardiology
Family Medicine
Gyn Onc
Infectious Disease
Nephrology
Palliative Care
Palliative Care
Surgery
Urology
Family Medicine
Family Medicine
Infectious Disease
Gyn Onc

## 2017-01-03 NOTE — PROGRESS NOTE ADULT - SUBJECTIVE AND OBJECTIVE BOX
GYNECOLOGIC ONCOLOGY PROGRESS NOTE    Called by RN to be notified that patient .   Patient seen and examined.   Patient not alert, unable to arouse, not responsive to   verbal or tactile stimuli.  Pupils fixed and non reactive.  No spontaneous breathing noted.   Breath and heart sounds absent.   No palpable pulses.   Time of death 10:34 AM  Family and attending notified.

## 2017-01-03 NOTE — PROGRESS NOTE ADULT - PROBLEM SELECTOR PROBLEM 4
Anemia due to blood loss
Anemia due to blood loss
Clostridium difficile colitis
Enterobacter sepsis
Gram negative sepsis
Clostridium difficile colitis
Clostridium difficile colitis
Diabetes
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
Pulmonary embolism, other
UTI (urinary tract infection)
Urinary retention
Urinary retention
Uterine sarcoma
Uterine sarcoma
Enterobacter sepsis
Functional quadriplegia
Urinary retention

## 2017-01-03 NOTE — PROGRESS NOTE ADULT - PROBLEM SELECTOR PROBLEM 3
Enterobacter sepsis
Sepsis, due to unspecified organism
Clostridium difficile colitis
Hematuria, gross
Hypotension, unspecified hypotension type
Malignant neoplasm of cervix, unspecified site
Uterine sarcoma
Clostridium difficile colitis
Enterobacter sepsis
Gram negative sepsis
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
Palliative care encounter
Pulmonary embolism, other
Urinary retention
Uterine sarcoma
Enterobacter sepsis
Functional quadriplegia
Pulmonary embolism, other
Uterine sarcoma

## 2017-01-03 NOTE — PROGRESS NOTE ADULT - SUBJECTIVE AND OBJECTIVE BOX
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 Patient is not responsive.   Breathing heavily and exerting.   Received 3XPRBC yesterday for severe anemia 6.2. Hb increased appropriately to 9  Afebrile, no Tachycardia  Patient completed IV Polymyxin on Jan 1. She is on vancomycin for C.Diff colitis  She had 1 significant episode of bleeding yesterday during mid day that did not recur.         OBJECTIVE:     VITALS:  T(F): 99.2, Max: 99.2 (01-02 @ 21:52)  HR: 90 (90 - 99)  BP: 60/40 (60/40 - 82/40)  RR: 16 (16 - 18)        MEDICATIONS  (STANDING):  insulin lispro (HumaLOG) corrective regimen sliding scale  SubCutaneous three times a day before meals  dextrose 50% Injectable 12.5Gram(s) IV Push once  dextrose 50% Injectable 25Gram(s) IV Push once  dextrose 50% Injectable 25Gram(s) IV Push once  metoprolol succinate ER 50milliGRAM(s) Oral daily  tamsulosin 0.4milliGRAM(s) Oral at bedtime  clotrimazole Lozenge 1Lozenge Oral five times a day  saccharomyces boulardii 250milliGRAM(s) Oral two times a day  vancomycin    Solution 500milliGRAM(s) Oral every 6 hours  sodium chloride 0.9%. 1000milliLiter(s) IV Continuous <Continuous>  sodium chloride 1Gram(s) Oral daily    MEDICATIONS  (PRN):  dextrose Gel 1Dose(s) Oral once PRN Blood Glucose LESS THAN 70 milliGRAM(s)/deciLiter  glucagon  Injectable 1milliGRAM(s) IntraMuscular once PRN Glucose <70 milliGRAM(s)/deciLiter  acetaminophen   Tablet. 650milliGRAM(s) Oral every 6 hours PRN headache/pain  benzocaine 15 mG/menthol 3.6 mG Lozenge 1Lozenge Oral three times a day PRN Sore Throat  ondansetron Injectable 4milliGRAM(s) IV Push every 6 hours PRN Nausea and/or Vomiting  oxyCODONE  5 mG/acetaminophen 325 mG 1Tablet(s) Oral every 4 hours PRN Mild Pain (1 - 3)  oxyCODONE  5 mG/acetaminophen 325 mG 2Tablet(s) Oral every 4 hours PRN Moderate Pain (4 - 6)  HYDROmorphone  Injectable 1milliGRAM(s) IV Push every 4 hours PRN Severe Pain (7 - 10)  metoclopramide Injectable 10milliGRAM(s) IV Push every 6 hours PRN nausea      Physical Exam:  Pulmonary: clear to auscultation bilaterally   Cardiovascular: Regular rate and rhythm   Abdomen: soft, non-tender, non-distended, normal bowel sounds  Extremities: no lower extremity edema or calve tenderness, Samir's sign negative.        LABS:                        9.0    22.55 )-----------( 217      ( 02 Jan 2017 20:06 )             25.2     02 Jan 2017 09:25    129    |  99     |  32.0   ----------------------------<  122    4.9     |  20.0   |  1.59     Ca    6.9        02 Jan 2017 09:25

## 2017-01-03 NOTE — PROGRESS NOTE ADULT - PROBLEM SELECTOR PLAN 1
Continue vancomycin  Regular diet  Ambulate
for OR per Dr VIRGINIA Paez
palliative and hospice per GYN
1. CBI  2. Cystoscopy today  3. Resume lovenox after cystoscopy  4. Continue vancomycin   5. F/U CBC
1. Continue PO Vancomycin and IV Flagyl  2. Follow up urine culture  3. ID follow up  4. Regular diet  5. Follow up creatinine  6. Discuss conversion of lovenox to another anti coagulant
1. Continue current diet  2. Hospice/family meeting planned for today  3. PT consulted  4. f/u surgical path and urine cytology  5. f/u AM chemistry
1. Continue diet  2. Pain/symptom control  3. Abx per ID for c dif, f/u plan for urine cultures  4. saul in place and flomax started per Dr. Darby's recommendation
1. pain/symptom control  2. continue current diet  3. f/u AM CBC
ABX
Ambulate  Complete PRBC  HB post transfusion   Urine for cytology  Urology consult  Irrigate saul as needed for clotting.   complete Vancomycin
Ambulate  Continue PO vancomycin  Continue IV ABx  Bladder irrigation - if urine clean by 2-3pm, clamp.   Regular diet  Hospice consult
Ambulate with PT  Continue lovenox  Continue IV ABx, follow up ED recommendations  Hospice planning.   Regular diet  Medicine recommendation for anticoagulation.
Ambulate with PT  Lovenox  Continue IV and PO abx  Regular diabetic diet
Ambulate with PT  Regular diet  Complete IV ABx  continue vancomycin PO  Lovenox  Manning follow up
CBI  CBC follow up  Pain medications as needed
Continue C.Diff medications  Regular diet  Ambulate  Pain meds as needed
Continue IV ABx till January 1st 2017  Continue PO Vancomycin  Keep saul in when planned for D/C  Plan for Hospice once IV ABx is complete.  Continue Lovenox  Pain medications as needed  Follow up Hb and WBC
Continue IV and PO ABx  Ambulate  Regular diet  Follow up blood and urine cultures
Continue Sita Paez consult  Monitor UOP  ambulate
Continue Vancomycin  Continue bladder irrigation  CBC at least 2 hours post transfusion  Follow up with Dr. Paez
Continue lovenox  ID consult for UTI  Continue IV Flagyl and PO + enema of vancomycin  Dr. Paez consult
Continuous bladder irrigation   Ambulate  continue lovenox at this point  Complete PRBC and repeat CBC 4 hours post transfusion  Continue Vancomycin  ID consult
F/U CBC  Supportive measures  IVH  Pain medications as needed
Follow up blood and urine cultures  Continue Invanz  Continue PO Vanco   Follow up C.diff  ID consult  Complete PRBC transfusion, followed by labs
IV ABx, pending sensitivities  PRBCX2  Hospice follow up
IVH bolus  PRBCX2 with premedications  Complete IV ABx  PO Vancomycin  Pain medications as needed  Lovenox, if continues bleeding will hold off on evening dose.
Regular diet  Ambulate with PT  Continue vancomycin  Polymyxin till 1/1/17  F/U urine output  Lovenox - therapeutic  Keep saul  Hospice planning.
Urology consult  Continue Vancomycin   PRBCX2  Ambulate  Keep saul in  Continue lovenox
Vanco PO  Lovenox  ID consult  Ambulate  Regular diet  IVH
continue IV ABx  Continue Vancomycin  Ambulate  Follow up UOP  Hospice consult  ID and urology follow up
treat UTI    Saul to gravity    Please call Dr. Paez and ask for recommendations for ultimate management of her chronic urinary retention.  I believe she should have her UTI treated and be discharged with the saul to FU as an outpatient for management.
-not a candidate for further treatments.
poor control  - increased dose to 500mg Q6H
-poor prognosis - no further antineoplastic treatments planned.

## 2017-01-03 NOTE — PROGRESS NOTE ADULT - PROBLEM SELECTOR PROBLEM 2
Uterine sarcoma
Clostridium difficile colitis
Clostridium difficile colitis
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
Uterine sarcoma
Uterine sarcoma
Clostridium difficile colitis
Enterobacter sepsis
Hematuria, gross
Hematuria, gross
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
UTI (urinary tract infection)
UTI (urinary tract infection)
Urinary retention
Uterine sarcoma
Clostridium difficile colitis
Clostridium difficile colitis
Pain

## 2017-01-03 NOTE — CHART NOTE - NSCHARTNOTEFT_GEN_A_CORE
Patient declining over night.  Underwent transfusion of 3U PRBC during the day shift for acute anemia.  H+H improved but BP continued to decrease as well as u/o.    Plan was for repeat CBC at midnight to determine if patient would benefit from another transfusion.  At that time, phlebotomist was unable to draw blood.    After multiple attempts it was decided to draw off the patient's chest wall port.  At that time, family members present refused further attempts at blood draws.                            9.0    22.55 )-----------( 217      ( 02 Jan 2017 20:06 )             25.2     Gen: Patient increasingly lethargic.  Minimally arousable - no intelligible speech.  No eye-opening  Gyn: vaginal bleeding stopped  Abd: No pain response elicited with palpation    At this time, plan is for observation and comfort care with routine vital checks.   Discussed with family multiple times over the course of the night.

## 2017-01-03 NOTE — PROGRESS NOTE ADULT - PROBLEM SELECTOR PROBLEM 5
Diabetes
Drop in hemoglobin
Hematuria, gross
Clostridium difficile colitis
Clostridium difficile colitis
Functional quadriplegia
Urinary retention
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
Palliative care encounter

## 2024-02-16 NOTE — PROGRESS NOTE ADULT - SUBJECTIVE AND OBJECTIVE BOX
GYNECOLOGIC ONCOLOGY PROGRESS NOTE    POD#    PROBLEMS:    Palliative care encounter  Enterobacter sepsis  Clostridium difficile colitis  UTI (urinary tract infection)  Urinary retention  Pulmonary embolism, other  Uterine sarcoma      Pt seen and examined at bedside. Reports having diarrhea again, patient on polymyxin for urinary CRE E.Coli and bacteremia with MDR Enterobacter  until january 1, 2017 per ID recommendation.   Planned for hospice follow up after that.   Pain well-controlled.  Denies Nausea, Vomiting. Denies shortness of breath, chest pain or dyspnea on exertion.  Tolerating diet.  WBC continues to trend down.   HB 9.4 today.  Treated with PO Vanco for C.diff colitis.       OBJECTIVE:     VITALS:  T(F): --  HR: 69 (69 - 70)  BP: 139/67 (130/75 - 139/67)  RR: 18 (18 - 18)  SpO2: --  Wt(kg): --    I&O's Summary      MEDICATIONS  (STANDING):  vancomycin    Solution 250milliGRAM(s) Oral every 6 hours  insulin lispro (HumaLOG) corrective regimen sliding scale  SubCutaneous three times a day before meals  dextrose 5%. 1000milliLiter(s) IV Continuous <Continuous>  dextrose 50% Injectable 12.5Gram(s) IV Push once  dextrose 50% Injectable 25Gram(s) IV Push once  dextrose 50% Injectable 25Gram(s) IV Push once  metoprolol succinate ER 50milliGRAM(s) Oral daily  dextrose 5%. 1000milliLiter(s) IV Continuous <Continuous>  tamsulosin 0.4milliGRAM(s) Oral at bedtime  polymyxin B IVPB [id number](s) IV Intermittent every 12 hours  clotrimazole Lozenge 1Lozenge Oral five times a day  saccharomyces boulardii 250milliGRAM(s) Oral two times a day  enoxaparin Injectable 70milliGRAM(s) SubCutaneous every 12 hours  sodium chloride 0.9% with potassium chloride 20 mEq/L 1000milliLiter(s) IV Continuous <Continuous>    MEDICATIONS  (PRN):  dextrose Gel 1Dose(s) Oral once PRN Blood Glucose LESS THAN 70 milliGRAM(s)/deciLiter  glucagon  Injectable 1milliGRAM(s) IntraMuscular once PRN Glucose <70 milliGRAM(s)/deciLiter  acetaminophen   Tablet. 650milliGRAM(s) Oral every 6 hours PRN headache/pain  benzocaine 15 mG/menthol 3.6 mG Lozenge 1Lozenge Oral three times a day PRN Sore Throat      Physical Exam:  Constitutional: NAD  Pulmonary: clear to auscultation bilaterally   Cardiovascular: Regular rate and rhythm   Abdomen: soft, non-tender, non-distended, normal bowel sounds  Extremities: no lower extremity edema or calve tenderness, Samir's sign negative.      LABS:                        9.4    11.25 )-----------( 361      ( 30 Dec 2016 06:09 )             28.3     30 Dec 2016 06:09    135    |  102    |  6.0    ----------------------------<  77     3.5     |  23.0   |  0.57     Ca    7.1        30 Dec 2016 06:09  Mg     1.7       30 Dec 2016 06:09    TPro  x      /  Alb  1.4    /  TBili  x      /  DBili  x      /  AST  x      /  ALT  x      /  AlkPhos  x      30 Dec 2016 06:09        Plan:  1. Ambulate with PT  2. Regular diet  3. Compete ABX  4. Continue PO vancomycin Yes

## 2024-05-25 NOTE — PATIENT PROFILE ADULT. - NSSUBSTANCEUSE_GEN_ALL_CORE_SD
never used
You can access the FollowMyHealth Patient Portal offered by [facility] by registering at the following website: [email or web address]. By joining [employer]’s FollowMyHealth portal, you will also be able to view your health information using other applications (apps) compatible with our system.